# Patient Record
Sex: FEMALE | Race: WHITE | Employment: OTHER | URBAN - METROPOLITAN AREA
[De-identification: names, ages, dates, MRNs, and addresses within clinical notes are randomized per-mention and may not be internally consistent; named-entity substitution may affect disease eponyms.]

---

## 2017-02-21 ENCOUNTER — HOSPITAL ENCOUNTER (OUTPATIENT)
Dept: ULTRASOUND IMAGING | Facility: HOSPITAL | Age: 77
Discharge: HOME | End: 2017-02-21
Attending: OTOLARYNGOLOGY
Payer: MEDICARE

## 2017-02-21 DIAGNOSIS — E04.2 NONTOXIC MULTINODULAR GOITER: ICD-10-CM

## 2017-02-21 PROCEDURE — 76536 US EXAM OF HEAD AND NECK: CPT

## 2017-05-17 PROCEDURE — 87086 URINE CULTURE/COLONY COUNT: CPT | Performed by: FAMILY MEDICINE

## 2017-05-17 PROCEDURE — 87077 CULTURE AEROBIC IDENTIFY: CPT | Performed by: FAMILY MEDICINE

## 2017-05-17 PROCEDURE — 87186 SC STD MICRODIL/AGAR DIL: CPT | Performed by: FAMILY MEDICINE

## 2017-06-06 ENCOUNTER — APPOINTMENT (OUTPATIENT)
Dept: GENERAL RADIOLOGY | Age: 77
End: 2017-06-06
Attending: PHYSICIAN ASSISTANT
Payer: MEDICARE

## 2017-06-06 ENCOUNTER — HOSPITAL ENCOUNTER (OUTPATIENT)
Age: 77
Discharge: HOME | End: 2017-06-06
Payer: MEDICARE

## 2017-06-06 VITALS
OXYGEN SATURATION: 98 % | RESPIRATION RATE: 18 BRPM | SYSTOLIC BLOOD PRESSURE: 141 MMHG | WEIGHT: 145 LBS | DIASTOLIC BLOOD PRESSURE: 66 MMHG | HEART RATE: 73 BPM | BODY MASS INDEX: 25 KG/M2 | TEMPERATURE: 98 F

## 2017-06-06 DIAGNOSIS — T14.8XXA SKIN AVULSION: ICD-10-CM

## 2017-06-06 DIAGNOSIS — R03.0 ELEVATED BLOOD PRESSURE, SITUATIONAL: ICD-10-CM

## 2017-06-06 DIAGNOSIS — S50.11XA CONTUSION OF RIGHT FOREARM, INITIAL ENCOUNTER: Primary | ICD-10-CM

## 2017-06-06 PROCEDURE — 73090 X-RAY EXAM OF FOREARM: CPT | Performed by: PHYSICIAN ASSISTANT

## 2017-06-06 PROCEDURE — 99214 OFFICE O/P EST MOD 30 MIN: CPT

## 2017-06-06 PROCEDURE — 90471 IMMUNIZATION ADMIN: CPT

## 2017-06-06 PROCEDURE — 99204 OFFICE O/P NEW MOD 45 MIN: CPT

## 2017-06-07 NOTE — ED INITIAL ASSESSMENT (HPI)
Pt c/o was hit in the right FA by a foul baseball today 30 min ago. Pt presents with skin tear/superficial avulsion to right FA. Pt reports pain on opening and closing right fist. Denies other injuries or complaints.

## 2017-06-07 NOTE — ED PROVIDER NOTES
Patient Seen in: [facility] In [address]    History   Patient presents with:  Upper Extremity Injury (musculoskeletal)    Stated Complaint: right arm got hit with ball and tore skin off    HPI    70 yo female here with c/o a large skin avuls Doxazosin Mesylate 2 MG Oral Tab,  Take 1 tablet (2 mg total) by mouth daily.    Methenamine Hippurate 1 G Oral Tab,  Take one tablet with vitamin C after intercourse as needed   Multiple Vitamins-Minerals (CENTRUM SILVER) Oral Tab,  Take 1 tablet by mouth Head: Normocephalic.    Right Ear: Tympanic membrane normal.   Left Ear: Tympanic membrane normal.   Nose: Nose normal.   Mouth/Throat: Uvula is midline, oropharynx is clear and moist and mucous membranes are normal.   Eyes: Conjunctivae and EOM are normal. I discussed the plan of care with the patient, who expresses understanding. All questions and concerns are addressed to the patients satisfaction prior to discharge today.          Disposition and Plan     Clinical Impression:  Contusion of right forearm, i

## 2017-08-05 ENCOUNTER — HOSPITAL ENCOUNTER (OUTPATIENT)
Dept: MRI IMAGING | Age: 77
Discharge: HOME | End: 2017-08-05
Attending: FAMILY MEDICINE
Payer: MEDICARE

## 2017-08-05 DIAGNOSIS — M25.862 CYST OF KNEE JOINT, LEFT: ICD-10-CM

## 2017-08-05 DIAGNOSIS — M17.0 PRIMARY OSTEOARTHRITIS OF BOTH KNEES: ICD-10-CM

## 2017-08-05 PROCEDURE — 73721 MRI JNT OF LWR EXTRE W/O DYE: CPT | Performed by: FAMILY MEDICINE

## 2017-08-09 NOTE — PROGRESS NOTES
MRI shows several problems in the L knee including a meniscal tear on the medial side as well as moderate-severe arthritis throughout the knee in all 3 compartments. She also has extensive degeneration of the ACL but it is still intact.   She needs to see

## 2017-08-21 PROCEDURE — 87086 URINE CULTURE/COLONY COUNT: CPT | Performed by: PHYSICIAN ASSISTANT

## 2017-08-21 PROCEDURE — 87186 SC STD MICRODIL/AGAR DIL: CPT | Performed by: PHYSICIAN ASSISTANT

## 2017-08-21 PROCEDURE — 87077 CULTURE AEROBIC IDENTIFY: CPT | Performed by: PHYSICIAN ASSISTANT

## 2017-09-20 PROBLEM — M17.12 PRIMARY OSTEOARTHRITIS OF LEFT KNEE: Status: ACTIVE | Noted: 2017-09-20

## 2017-11-15 PROCEDURE — 87086 URINE CULTURE/COLONY COUNT: CPT | Performed by: UROLOGY

## 2017-11-15 PROCEDURE — 87186 SC STD MICRODIL/AGAR DIL: CPT | Performed by: UROLOGY

## 2018-01-26 PROCEDURE — 87086 URINE CULTURE/COLONY COUNT: CPT | Performed by: UROLOGY

## 2018-01-26 PROCEDURE — 87186 SC STD MICRODIL/AGAR DIL: CPT | Performed by: UROLOGY

## 2018-01-26 PROCEDURE — 87077 CULTURE AEROBIC IDENTIFY: CPT | Performed by: UROLOGY

## 2018-03-05 PROCEDURE — 87186 SC STD MICRODIL/AGAR DIL: CPT | Performed by: OBSTETRICS & GYNECOLOGY

## 2018-03-05 PROCEDURE — 87086 URINE CULTURE/COLONY COUNT: CPT | Performed by: OBSTETRICS & GYNECOLOGY

## 2018-03-05 PROCEDURE — 87077 CULTURE AEROBIC IDENTIFY: CPT | Performed by: OBSTETRICS & GYNECOLOGY

## 2018-03-28 PROCEDURE — 87086 URINE CULTURE/COLONY COUNT: CPT | Performed by: OBSTETRICS & GYNECOLOGY

## 2018-03-28 PROCEDURE — 87186 SC STD MICRODIL/AGAR DIL: CPT | Performed by: OBSTETRICS & GYNECOLOGY

## 2018-03-28 PROCEDURE — 87077 CULTURE AEROBIC IDENTIFY: CPT | Performed by: OBSTETRICS & GYNECOLOGY

## 2018-04-16 PROCEDURE — 87186 SC STD MICRODIL/AGAR DIL: CPT | Performed by: OBSTETRICS & GYNECOLOGY

## 2018-04-16 PROCEDURE — 87086 URINE CULTURE/COLONY COUNT: CPT | Performed by: OBSTETRICS & GYNECOLOGY

## 2018-04-16 PROCEDURE — 87077 CULTURE AEROBIC IDENTIFY: CPT | Performed by: OBSTETRICS & GYNECOLOGY

## 2018-05-04 PROCEDURE — 87086 URINE CULTURE/COLONY COUNT: CPT | Performed by: OBSTETRICS & GYNECOLOGY

## 2018-05-04 PROCEDURE — 87186 SC STD MICRODIL/AGAR DIL: CPT | Performed by: OBSTETRICS & GYNECOLOGY

## 2018-05-04 PROCEDURE — 87077 CULTURE AEROBIC IDENTIFY: CPT | Performed by: OBSTETRICS & GYNECOLOGY

## 2018-08-21 PROCEDURE — 87077 CULTURE AEROBIC IDENTIFY: CPT | Performed by: OBSTETRICS & GYNECOLOGY

## 2018-08-21 PROCEDURE — 87086 URINE CULTURE/COLONY COUNT: CPT | Performed by: OBSTETRICS & GYNECOLOGY

## 2018-08-21 PROCEDURE — 81001 URINALYSIS AUTO W/SCOPE: CPT | Performed by: OBSTETRICS & GYNECOLOGY

## 2018-08-21 PROCEDURE — 87186 SC STD MICRODIL/AGAR DIL: CPT | Performed by: OBSTETRICS & GYNECOLOGY

## 2018-09-20 PROCEDURE — 87077 CULTURE AEROBIC IDENTIFY: CPT | Performed by: OBSTETRICS & GYNECOLOGY

## 2018-09-20 PROCEDURE — 87186 SC STD MICRODIL/AGAR DIL: CPT | Performed by: OBSTETRICS & GYNECOLOGY

## 2018-09-20 PROCEDURE — 87086 URINE CULTURE/COLONY COUNT: CPT | Performed by: OBSTETRICS & GYNECOLOGY

## 2018-10-12 PROCEDURE — 87086 URINE CULTURE/COLONY COUNT: CPT | Performed by: EMERGENCY MEDICINE

## 2018-10-26 PROCEDURE — 87086 URINE CULTURE/COLONY COUNT: CPT | Performed by: OBSTETRICS & GYNECOLOGY

## 2018-10-26 PROCEDURE — 87186 SC STD MICRODIL/AGAR DIL: CPT | Performed by: OBSTETRICS & GYNECOLOGY

## 2018-10-26 PROCEDURE — 87077 CULTURE AEROBIC IDENTIFY: CPT | Performed by: OBSTETRICS & GYNECOLOGY

## 2018-11-19 PROCEDURE — 87088 URINE BACTERIA CULTURE: CPT | Performed by: OBSTETRICS & GYNECOLOGY

## 2018-11-19 PROCEDURE — 87086 URINE CULTURE/COLONY COUNT: CPT | Performed by: OBSTETRICS & GYNECOLOGY

## 2018-11-19 PROCEDURE — 87186 SC STD MICRODIL/AGAR DIL: CPT | Performed by: OBSTETRICS & GYNECOLOGY

## 2018-12-04 PROCEDURE — 87088 URINE BACTERIA CULTURE: CPT | Performed by: OBSTETRICS & GYNECOLOGY

## 2018-12-04 PROCEDURE — 87086 URINE CULTURE/COLONY COUNT: CPT | Performed by: OBSTETRICS & GYNECOLOGY

## 2018-12-04 PROCEDURE — 81001 URINALYSIS AUTO W/SCOPE: CPT | Performed by: OBSTETRICS & GYNECOLOGY

## 2018-12-04 PROCEDURE — 87186 SC STD MICRODIL/AGAR DIL: CPT | Performed by: OBSTETRICS & GYNECOLOGY

## 2019-02-21 PROBLEM — E55.9 VITAMIN D DEFICIENCY: Status: ACTIVE | Noted: 2019-02-21

## 2019-02-21 PROBLEM — R52 PAINFUL SCAR: Status: ACTIVE | Noted: 2019-02-21

## 2019-02-21 PROBLEM — L90.5 PAINFUL SCAR: Status: ACTIVE | Noted: 2019-02-21

## 2019-03-21 ENCOUNTER — HOSPITAL ENCOUNTER (OUTPATIENT)
Dept: CT IMAGING | Facility: HOSPITAL | Age: 79
Discharge: HOME | End: 2019-03-21
Attending: FAMILY MEDICINE

## 2019-03-21 DIAGNOSIS — Z13.9 ENCOUNTER FOR SCREENING: ICD-10-CM

## 2019-04-15 ENCOUNTER — TELEPHONE (OUTPATIENT)
Dept: CARDIAC REHAB | Facility: HOSPITAL | Age: 79
End: 2019-04-15

## 2019-04-15 NOTE — PROGRESS NOTES
Pt with HS scan done on 3/21/19 initial prelim score was 1.43 and her score increased to 23.37. Pt notified of score variance and change in risk for low to mild risk. Pt enc to follow up with MD for further guidance on prevention. ( previous HS in 2015 was

## 2019-05-02 NOTE — PROGRESS NOTES
Pt with a minimal amount of coronary artery plaque but at her age that is a good thing. No need for further treatment.

## 2019-05-22 PROCEDURE — 87077 CULTURE AEROBIC IDENTIFY: CPT | Performed by: OBSTETRICS & GYNECOLOGY

## 2019-05-22 PROCEDURE — 87186 SC STD MICRODIL/AGAR DIL: CPT | Performed by: OBSTETRICS & GYNECOLOGY

## 2019-05-22 PROCEDURE — 87086 URINE CULTURE/COLONY COUNT: CPT | Performed by: OBSTETRICS & GYNECOLOGY

## 2019-05-22 PROCEDURE — 81001 URINALYSIS AUTO W/SCOPE: CPT | Performed by: OBSTETRICS & GYNECOLOGY

## 2019-06-10 PROCEDURE — 87086 URINE CULTURE/COLONY COUNT: CPT | Performed by: OBSTETRICS & GYNECOLOGY

## 2019-06-10 PROCEDURE — 87186 SC STD MICRODIL/AGAR DIL: CPT | Performed by: OBSTETRICS & GYNECOLOGY

## 2019-06-10 PROCEDURE — 87077 CULTURE AEROBIC IDENTIFY: CPT | Performed by: OBSTETRICS & GYNECOLOGY

## 2019-06-27 PROCEDURE — 87086 URINE CULTURE/COLONY COUNT: CPT | Performed by: OBSTETRICS & GYNECOLOGY

## 2019-08-14 PROCEDURE — 87077 CULTURE AEROBIC IDENTIFY: CPT | Performed by: OBSTETRICS & GYNECOLOGY

## 2019-08-14 PROCEDURE — 87086 URINE CULTURE/COLONY COUNT: CPT | Performed by: OBSTETRICS & GYNECOLOGY

## 2019-08-14 PROCEDURE — 87186 SC STD MICRODIL/AGAR DIL: CPT | Performed by: OBSTETRICS & GYNECOLOGY

## 2019-10-09 PROCEDURE — 87088 URINE BACTERIA CULTURE: CPT | Performed by: UROLOGY

## 2019-10-09 PROCEDURE — 87186 SC STD MICRODIL/AGAR DIL: CPT | Performed by: UROLOGY

## 2019-10-09 PROCEDURE — 87086 URINE CULTURE/COLONY COUNT: CPT | Performed by: UROLOGY

## 2020-05-20 PROBLEM — S03.00XA DISLOCATION OF TEMPOROMANDIBULAR JOINT: Status: ACTIVE | Noted: 2020-05-20

## 2020-05-20 PROBLEM — K59.00 CONSTIPATION: Status: ACTIVE | Noted: 2020-05-20

## 2020-05-20 PROBLEM — H26.9 CATARACT: Status: ACTIVE | Noted: 2020-05-20

## 2020-06-18 ENCOUNTER — OFFICE VISIT (OUTPATIENT)
Dept: PODIATRY CLINIC | Facility: CLINIC | Age: 80
End: 2020-06-18
Payer: MEDICARE

## 2020-06-18 DIAGNOSIS — M21.70 ACQUIRED UNEQUAL LIMB LENGTH: ICD-10-CM

## 2020-06-18 DIAGNOSIS — M21.6X1 PLANTAR FLEXED METATARSAL BONE OF RIGHT FOOT: ICD-10-CM

## 2020-06-18 DIAGNOSIS — B35.1 DERMATOPHYTOSIS OF NAIL: ICD-10-CM

## 2020-06-18 DIAGNOSIS — M79.672 LEFT FOOT PAIN: Primary | ICD-10-CM

## 2020-06-18 DIAGNOSIS — M20.5X9 HALLUX LIMITUS, UNSPECIFIED LATERALITY: ICD-10-CM

## 2020-06-18 DIAGNOSIS — M79.671 RIGHT FOOT PAIN: ICD-10-CM

## 2020-06-18 PROCEDURE — 99203 OFFICE O/P NEW LOW 30 MIN: CPT | Performed by: PODIATRIST

## 2020-06-18 NOTE — PROGRESS NOTES
Miah Finn is a 79year old female. Patient presents with:  New Patient: Patient thinks that she may have warts on the bottom the right foot, toe nail fungus, and that she believes that the right leg is shorter than the left.  Patient is a Media Ama GERD (gastroesophageal reflux disease)     Dr. Immanuel Lucio EGS adn colosncopy 2010   • Migraine headache    • Osteoporosis    • Urinary retention       Past Surgical History:   Procedure Laterality Date   • HIP INJECTION (PAIN) N/A 8/22/2013    Performed by Marylene Ode normal.   3. Musculoskeletal: All muscle groups are graded 5 out of 5 in the foot and ankle.    4. Neurological: Normal sharp dull sensation; reflexes normal.  She has dorsal spurring with decreased motion through the great toe joint on both feet she has we

## 2020-07-27 ENCOUNTER — OFFICE VISIT (OUTPATIENT)
Dept: PODIATRY CLINIC | Facility: CLINIC | Age: 80
End: 2020-07-27
Payer: MEDICARE

## 2020-07-27 DIAGNOSIS — B35.1 DERMATOPHYTOSIS OF NAIL: ICD-10-CM

## 2020-07-27 DIAGNOSIS — M21.70 ACQUIRED UNEQUAL LIMB LENGTH: ICD-10-CM

## 2020-07-27 DIAGNOSIS — M20.5X9 HALLUX LIMITUS, UNSPECIFIED LATERALITY: ICD-10-CM

## 2020-07-27 DIAGNOSIS — M79.672 LEFT FOOT PAIN: Primary | ICD-10-CM

## 2020-07-27 DIAGNOSIS — M79.671 RIGHT FOOT PAIN: ICD-10-CM

## 2020-07-27 PROCEDURE — 99213 OFFICE O/P EST LOW 20 MIN: CPT | Performed by: PODIATRIST

## 2020-07-27 NOTE — PROGRESS NOTES
Cesario Rodríguez is a 79year old female. Patient presents with:  Test Results: nail fungus - heel lift wans't comfortable - joint pain. HPI:     Presents today cultures were positive for nail fungus discussed treatment with her.     Allergies: SURGERY      bilateral   Dr Beather Cooks  right 2015  left 2016    • LEFT PHACOEMULSIFICATION OF CATARACT WITH INTRAOCULAR LENS IMPLANT [id number] Left 6/24/2020    Performed by Alyson Chavis MD at [address] [place] [address]   • OTHER      R THR   • OTHER SURGICAL HIS Musculoskeletal: All muscle groups are graded 5 out of 5 in the foot and ankle.    4. Neurological: Normal sharp dull sensation; reflexes normal.  nails are thick and dystrophic we discussed briefly the topical medication versus oral she is not a fan of the

## 2020-08-17 ENCOUNTER — TELEPHONE (OUTPATIENT)
Dept: CARDIOLOGY | Age: 80
End: 2020-08-17

## 2020-08-28 ENCOUNTER — LAB ENCOUNTER (OUTPATIENT)
Dept: LAB | Age: 80
End: 2020-08-28
Attending: INTERNAL MEDICINE
Payer: MEDICARE

## 2020-08-28 DIAGNOSIS — I10 ESSENTIAL HYPERTENSION: ICD-10-CM

## 2020-08-28 PROCEDURE — 36415 COLL VENOUS BLD VENIPUNCTURE: CPT

## 2020-08-28 PROCEDURE — 83835 ASSAY OF METANEPHRINES: CPT

## 2020-08-28 PROCEDURE — 84244 ASSAY OF RENIN: CPT

## 2020-08-28 PROCEDURE — 82088 ASSAY OF ALDOSTERONE: CPT

## 2020-08-31 LAB
METANEPHRINE: 0.12 NMOL/L
NORMETANEPHRINE: 0.51 NMOL/L

## 2020-09-07 LAB
ALDOSTERONE/RENIN ACTIVITY RATIO: 13.5 RATIO
ALDOSTERONE: 5.4 NG/DL
RENIN ACTIVITY: 0.4 NG/ML/HR

## 2021-01-04 ENCOUNTER — HOSPITAL ENCOUNTER (OUTPATIENT)
Dept: ULTRASOUND IMAGING | Age: 81
Discharge: HOME | End: 2021-01-04
Attending: OTOLARYNGOLOGY
Payer: MEDICARE

## 2021-01-04 DIAGNOSIS — E04.2 NONTOXIC MULTINODULAR GOITER: ICD-10-CM

## 2021-01-04 PROCEDURE — 76536 US EXAM OF HEAD AND NECK: CPT | Performed by: OTOLARYNGOLOGY

## 2021-04-04 ENCOUNTER — LAB ENCOUNTER (OUTPATIENT)
Dept: LAB | Facility: HOSPITAL | Age: 81
End: 2021-04-04
Attending: INTERNAL MEDICINE
Payer: MEDICARE

## 2021-04-04 DIAGNOSIS — Z01.818 PRE-OP TESTING: ICD-10-CM

## 2021-04-07 ENCOUNTER — HOSPITAL ENCOUNTER (EMERGENCY)
Facility: HOSPITAL | Age: 81
Discharge: HOME | End: 2021-04-07
Attending: EMERGENCY MEDICINE
Payer: MEDICARE

## 2021-04-07 VITALS
RESPIRATION RATE: 16 BRPM | HEART RATE: 87 BPM | SYSTOLIC BLOOD PRESSURE: 164 MMHG | WEIGHT: 139 LBS | DIASTOLIC BLOOD PRESSURE: 73 MMHG | TEMPERATURE: 98 F | BODY MASS INDEX: 24.63 KG/M2 | OXYGEN SATURATION: 100 % | HEIGHT: 63 IN

## 2021-04-07 DIAGNOSIS — N39.0 URINARY TRACT INFECTION WITHOUT HEMATURIA, SITE UNSPECIFIED: Primary | ICD-10-CM

## 2021-04-07 DIAGNOSIS — R33.9 URINARY RETENTION: ICD-10-CM

## 2021-04-07 PROCEDURE — 81001 URINALYSIS AUTO W/SCOPE: CPT | Performed by: EMERGENCY MEDICINE

## 2021-04-07 PROCEDURE — 87086 URINE CULTURE/COLONY COUNT: CPT | Performed by: EMERGENCY MEDICINE

## 2021-04-07 PROCEDURE — 87186 SC STD MICRODIL/AGAR DIL: CPT | Performed by: EMERGENCY MEDICINE

## 2021-04-07 PROCEDURE — 96372 THER/PROPH/DIAG INJ SC/IM: CPT

## 2021-04-07 PROCEDURE — 87077 CULTURE AEROBIC IDENTIFY: CPT | Performed by: EMERGENCY MEDICINE

## 2021-04-07 PROCEDURE — 99284 EMERGENCY DEPT VISIT MOD MDM: CPT

## 2021-04-07 PROCEDURE — 51702 INSERT TEMP BLADDER CATH: CPT

## 2021-04-07 PROCEDURE — 99283 EMERGENCY DEPT VISIT LOW MDM: CPT

## 2021-04-07 RX ORDER — LEVOFLOXACIN 250 MG/1
250 TABLET ORAL DAILY
Qty: 10 TABLET | Refills: 0 | Status: SHIPPED | OUTPATIENT
Start: 2021-04-07 | End: 2021-04-17

## 2021-04-07 NOTE — ED PROVIDER NOTES
Patient Seen in: [facility] Emergency Department      History   Patient presents with:  Urinary Symptoms    Stated Complaint: patient unable to self cath at home d/t swelling to vaginal area as prepping fo*    HPI/Subjective:   HPI    [de-identified]year old fe Laterality Date   • HIP INJECTION (PAIN) N/A 8/22/2013    Performed by Aravind Mayberry MD at [address]      bilateral   Dr Jamin Gong  right 2015  left 2016    • LEFT PHACOEMULSIFICATION OF CATARACT WITH INTRAOCU well-developed. HENT:      Head: Normocephalic and atraumatic. Cardiovascular:      Rate and Rhythm: Normal rate and regular rhythm. Heart sounds: Normal heart sounds.    Pulmonary:      Effort: Pulmonary effort is normal.      Breath sounds: Ayesha Antis been related to a urinary tract infection rather than the medication as this is an unusual side effect. Regardless we did monitor after receiving the ceftriaxone and she tolerated it well. She will be placed on a small dose of Levaquin.   I did contact ur

## 2021-04-07 NOTE — ED QUICK NOTES
Pt rpts she has cathed herself today, but doesn't feel as though the amount is \"enough. \" Pt here bc she has chronic UTIs, and her daughter is worried she may \"go septic. \" MD notified.

## 2021-04-07 NOTE — ED INITIAL ASSESSMENT (HPI)
PT straight caths for 7 years since hip surgery, is now a chronic UTI patient. PT has ongoing vaginal swelling as well. PT has last successful straight cath last night. Denies fevers.  Report having a \"full stomach\"

## 2021-04-08 ENCOUNTER — HOSPITAL ENCOUNTER (OUTPATIENT)
Facility: HOSPITAL | Age: 81
Setting detail: HOSPITAL OUTPATIENT SURGERY
Discharge: HOME | End: 2021-04-08
Attending: INTERNAL MEDICINE | Admitting: INTERNAL MEDICINE
Payer: MEDICARE

## 2021-04-08 ENCOUNTER — ANESTHESIA EVENT (OUTPATIENT)
Dept: ENDOSCOPY | Facility: HOSPITAL | Age: 81
End: 2021-04-08
Payer: MEDICARE

## 2021-04-08 ENCOUNTER — ANESTHESIA (OUTPATIENT)
Dept: ENDOSCOPY | Facility: HOSPITAL | Age: 81
End: 2021-04-08
Payer: MEDICARE

## 2021-04-08 VITALS
BODY MASS INDEX: 25.66 KG/M2 | WEIGHT: 143 LBS | RESPIRATION RATE: 12 BRPM | DIASTOLIC BLOOD PRESSURE: 66 MMHG | HEIGHT: 62.5 IN | TEMPERATURE: 99 F | OXYGEN SATURATION: 100 % | SYSTOLIC BLOOD PRESSURE: 138 MMHG | HEART RATE: 69 BPM

## 2021-04-08 DIAGNOSIS — R10.13 EPIGASTRIC PAIN: ICD-10-CM

## 2021-04-08 DIAGNOSIS — Z86.010 HISTORY OF ADENOMATOUS POLYP OF COLON: ICD-10-CM

## 2021-04-08 DIAGNOSIS — R14.0 BLOATING: ICD-10-CM

## 2021-04-08 PROCEDURE — 0DB98ZX EXCISION OF DUODENUM, VIA NATURAL OR ARTIFICIAL OPENING ENDOSCOPIC, DIAGNOSTIC: ICD-10-PCS | Performed by: INTERNAL MEDICINE

## 2021-04-08 PROCEDURE — 0DBK8ZX EXCISION OF ASCENDING COLON, VIA NATURAL OR ARTIFICIAL OPENING ENDOSCOPIC, DIAGNOSTIC: ICD-10-PCS | Performed by: INTERNAL MEDICINE

## 2021-04-08 PROCEDURE — 0DB78ZX EXCISION OF STOMACH, PYLORUS, VIA NATURAL OR ARTIFICIAL OPENING ENDOSCOPIC, DIAGNOSTIC: ICD-10-PCS | Performed by: INTERNAL MEDICINE

## 2021-04-08 PROCEDURE — 0DB58ZX EXCISION OF ESOPHAGUS, VIA NATURAL OR ARTIFICIAL OPENING ENDOSCOPIC, DIAGNOSTIC: ICD-10-PCS | Performed by: INTERNAL MEDICINE

## 2021-04-08 PROCEDURE — 0DBN8ZX EXCISION OF SIGMOID COLON, VIA NATURAL OR ARTIFICIAL OPENING ENDOSCOPIC, DIAGNOSTIC: ICD-10-PCS | Performed by: INTERNAL MEDICINE

## 2021-04-08 PROCEDURE — 88305 TISSUE EXAM BY PATHOLOGIST: CPT | Performed by: INTERNAL MEDICINE

## 2021-04-08 RX ORDER — HYDROCODONE BITARTRATE AND ACETAMINOPHEN 10; 325 MG/1; MG/1
2 TABLET ORAL AS NEEDED
Status: DISCONTINUED | OUTPATIENT
Start: 2021-04-08 | End: 2021-04-08

## 2021-04-08 RX ORDER — HYDROCODONE BITARTRATE AND ACETAMINOPHEN 10; 325 MG/1; MG/1
1 TABLET ORAL AS NEEDED
Status: DISCONTINUED | OUTPATIENT
Start: 2021-04-08 | End: 2021-04-08

## 2021-04-08 RX ORDER — NALOXONE HYDROCHLORIDE 0.4 MG/ML
80 INJECTION, SOLUTION INTRAMUSCULAR; INTRAVENOUS; SUBCUTANEOUS AS NEEDED
Status: DISCONTINUED | OUTPATIENT
Start: 2021-04-08 | End: 2021-04-08

## 2021-04-08 RX ORDER — SODIUM CHLORIDE, SODIUM LACTATE, POTASSIUM CHLORIDE, CALCIUM CHLORIDE 600; 310; 30; 20 MG/100ML; MG/100ML; MG/100ML; MG/100ML
INJECTION, SOLUTION INTRAVENOUS CONTINUOUS
Status: DISCONTINUED | OUTPATIENT
Start: 2021-04-08 | End: 2021-04-08

## 2021-04-08 RX ORDER — ONDANSETRON 2 MG/ML
4 INJECTION INTRAMUSCULAR; INTRAVENOUS AS NEEDED
Status: DISCONTINUED | OUTPATIENT
Start: 2021-04-08 | End: 2021-04-08

## 2021-04-08 RX ORDER — HYDROMORPHONE HYDROCHLORIDE 1 MG/ML
0.4 INJECTION, SOLUTION INTRAMUSCULAR; INTRAVENOUS; SUBCUTANEOUS EVERY 5 MIN PRN
Status: DISCONTINUED | OUTPATIENT
Start: 2021-04-08 | End: 2021-04-08

## 2021-04-08 RX ORDER — METOCLOPRAMIDE HYDROCHLORIDE 5 MG/ML
10 INJECTION INTRAMUSCULAR; INTRAVENOUS AS NEEDED
Status: DISCONTINUED | OUTPATIENT
Start: 2021-04-08 | End: 2021-04-08

## 2021-04-08 RX ORDER — LIDOCAINE HYDROCHLORIDE 10 MG/ML
INJECTION, SOLUTION EPIDURAL; INFILTRATION; INTRACAUDAL; PERINEURAL AS NEEDED
Status: DISCONTINUED | OUTPATIENT
Start: 2021-04-08 | End: 2021-04-08 | Stop reason: SURG

## 2021-04-08 RX ADMIN — SODIUM CHLORIDE, SODIUM LACTATE, POTASSIUM CHLORIDE, CALCIUM CHLORIDE: 600; 310; 30; 20 INJECTION, SOLUTION INTRAVENOUS at 12:27:00

## 2021-04-08 RX ADMIN — SODIUM CHLORIDE, SODIUM LACTATE, POTASSIUM CHLORIDE, CALCIUM CHLORIDE: 600; 310; 30; 20 INJECTION, SOLUTION INTRAVENOUS at 11:58:00

## 2021-04-08 RX ADMIN — LIDOCAINE HYDROCHLORIDE 25 MG: 10 INJECTION, SOLUTION EPIDURAL; INFILTRATION; INTRACAUDAL; PERINEURAL at 11:58:00

## 2021-04-08 NOTE — H&P
[id number] [facility] Patient Status:  Hospital Outpatient Surgery    1940 MRN [id number]   Location [id number] [place] [address] Attending Maximo Burgos MD   Date 2021 PCP Ricky Abreu MD biopsy 1980   • OTHER SURGICAL HISTORY  04/25/2014    flow us- Dr. Nehemiah Choi   • OTHER SURGICAL HISTORY  07/01/2019    cysto dr Thi Garcia   • Bryan Foster [id number] Right 7/22/2020    Performed by Justin Wan Dislocation of temporomandibular joint      Dyspepsia, GERD, gas bloat, early satiety, h/o colon polyps    Plan;  EGD and colonoscopy    Charly Delatorre  4/8/2021  11:53 AM

## 2021-04-08 NOTE — ANESTHESIA POSTPROCEDURE EVALUATION
[id number] [facility] Patient Status:  Hospital Outpatient Surgery   Age/Gender [de-identified]year old female MRN [id number]   Location [address] [address] Attending Dinora Pennington MD   Hosp Day # 0 PCP Aayush Rose MD

## 2021-04-08 NOTE — OPERATIVE REPORT
Laura Stewartolz Patient Status:  Hospital Outpatient Surgery    1940 MRN [id number]   Location [id number] [place] [address] Attending Lawrence Steinberg MD   Date 2021 PCP Regine Estrella MD     PREOPERATIVE DIAGNOSIS/INDICATI

## 2021-04-08 NOTE — OPERATIVE REPORT
Selmaolivier Fabian Patient Status:  Hospital Outpatient Surgery    1940 MRN [id number]   Location [address] [place] [address] Attending Nitesh Morgan MD   Date 2021 PCP Dariusz Read MD     PREOPERATIVE DIAGNOSIS/INDICATI precautions, watch for bleeding, infection, perforation, adverse drug reactions   - Follow biopsies.     Ann Raya  4/8/2021  12:29 PM

## 2021-04-08 NOTE — ANESTHESIA PREPROCEDURE EVALUATION
PRE-OP EVALUATION    Patient Name: Compa Gutierres    Admit Diagnosis: History of adenomatous polyp of colon [Z86.010]  Bloating [R14.0]  Epigastric pain [R10.13]    Pre-op Diagnosis: History of adenomatous polyp of colon [Z86.010]  Bloating [R14.0] Complications           GI/Hepatic/Renal      (+) GERD                           Cardiovascular                                                       Endo/Other                                  Pulmonary                           Neuro/Psych Cardiovascular    Cardiovascular exam normal.  Rhythm: regular  Rate: normal     Dental             Pulmonary    Pulmonary exam normal.                 Other findings            ASA: 2   Plan: MAC  NPO status verified and patient meets guidelines.

## 2021-06-06 ENCOUNTER — APPOINTMENT (OUTPATIENT)
Dept: CT IMAGING | Facility: HOSPITAL | Age: 81
End: 2021-06-06
Attending: EMERGENCY MEDICINE
Payer: MEDICARE

## 2021-06-06 ENCOUNTER — HOSPITAL ENCOUNTER (EMERGENCY)
Facility: HOSPITAL | Age: 81
Discharge: HOME | End: 2021-06-06
Attending: EMERGENCY MEDICINE
Payer: MEDICARE

## 2021-06-06 ENCOUNTER — APPOINTMENT (OUTPATIENT)
Dept: MRI IMAGING | Facility: HOSPITAL | Age: 81
End: 2021-06-06
Attending: EMERGENCY MEDICINE
Payer: MEDICARE

## 2021-06-06 VITALS
HEIGHT: 62 IN | OXYGEN SATURATION: 98 % | TEMPERATURE: 97 F | SYSTOLIC BLOOD PRESSURE: 142 MMHG | HEART RATE: 70 BPM | BODY MASS INDEX: 26.31 KG/M2 | WEIGHT: 143 LBS | RESPIRATION RATE: 16 BRPM | DIASTOLIC BLOOD PRESSURE: 73 MMHG

## 2021-06-06 DIAGNOSIS — R51.9 NONINTRACTABLE HEADACHE, UNSPECIFIED CHRONICITY PATTERN, UNSPECIFIED HEADACHE TYPE: Primary | ICD-10-CM

## 2021-06-06 PROCEDURE — 80053 COMPREHEN METABOLIC PANEL: CPT | Performed by: EMERGENCY MEDICINE

## 2021-06-06 PROCEDURE — 70553 MRI BRAIN STEM W/O & W/DYE: CPT | Performed by: EMERGENCY MEDICINE

## 2021-06-06 PROCEDURE — 70450 CT HEAD/BRAIN W/O DYE: CPT | Performed by: EMERGENCY MEDICINE

## 2021-06-06 PROCEDURE — A9575 INJ GADOTERATE MEGLUMI 0.1ML: HCPCS | Performed by: EMERGENCY MEDICINE

## 2021-06-06 PROCEDURE — 99284 EMERGENCY DEPT VISIT MOD MDM: CPT

## 2021-06-06 PROCEDURE — 85652 RBC SED RATE AUTOMATED: CPT | Performed by: EMERGENCY MEDICINE

## 2021-06-06 PROCEDURE — 99285 EMERGENCY DEPT VISIT HI MDM: CPT

## 2021-06-06 PROCEDURE — 85025 COMPLETE CBC W/AUTO DIFF WBC: CPT | Performed by: EMERGENCY MEDICINE

## 2021-06-06 RX ORDER — TETRACAINE HYDROCHLORIDE 5 MG/ML
1 SOLUTION OPHTHALMIC ONCE
Status: COMPLETED | OUTPATIENT
Start: 2021-06-06 | End: 2021-06-06

## 2021-06-06 RX ORDER — TETRACAINE HYDROCHLORIDE 5 MG/ML
SOLUTION OPHTHALMIC
Status: COMPLETED
Start: 2021-06-06 | End: 2021-06-06

## 2021-06-06 NOTE — ED INITIAL ASSESSMENT (HPI)
PT TO ED FROM HOME WITH C/O RIGHT SIDED HEADACHE THAT STARTED LAST NIGHT.  DENIES N/V, DENIES ANY VISUAL DISTURBANCES, RIGHT SIDED PAIN THAT RADIATES DOWN RIGHT SIDE OF FACE AND INTO NECK, \"FEELS LIKE PINS AND NEEDLES\"

## 2021-06-06 NOTE — ED PROVIDER NOTES
Patient Seen in: [facility] Emergency Department      History   Patient presents with:  Headache    Stated Complaint: Head is tender to the touch, travelling down her neck    HPI/Subjective:   HPI    Patient is a 20-year-old woman presents with a he Procedure: HIP INJECTION (PAIN); Surgeon: Marguerite Hazel MD;  Location: [facility]   • FLUOROSCOPIC GUIDANCE NEEDLE PLACEMENT  8/22/2013    Procedure: HIP INJECTION (PAIN);   Surgeon: Marguerite Hazel MD;  Location: [address] [place] [address] Dr NEWSOME Constitutional:       General: She is not in acute distress. Appearance: She is well-developed. She is not toxic-appearing. HENT:      Head: Normocephalic and atraumatic. Eyes:      General: No scleral icterus.      Conjunctiva/sclera: Conjunctiva CONTRAST, 5/01/2007, 10:38 AM.         INDICATIONS:  Head is tender to the touch, travelling down her neck         TECHNIQUE:  Noncontrast CT scanning is performed through the brain. Dose     reduction techniques were used.  Dose information is transmitted headache, unspecified chronicity pattern, unspecified headache type  (primary encounter diagnosis)     Disposition:  There is no disposition on file for this visit. There is no disposition time on file for this visit.     Follow-up:  Flavia Monge MD

## 2021-06-09 PROBLEM — K92.89 GAS BLOAT SYNDROME: Status: ACTIVE | Noted: 2021-06-09

## 2021-10-19 ENCOUNTER — HOSPITAL ENCOUNTER (OUTPATIENT)
Dept: ULTRASOUND IMAGING | Age: 81
Discharge: HOME | End: 2021-10-19
Attending: OTOLARYNGOLOGY
Payer: MEDICARE

## 2021-10-19 DIAGNOSIS — E04.2 MULTIPLE THYROID NODULES: ICD-10-CM

## 2021-10-19 PROCEDURE — 76536 US EXAM OF HEAD AND NECK: CPT | Performed by: OTOLARYNGOLOGY

## 2021-11-12 ENCOUNTER — HOSPITAL ENCOUNTER (OUTPATIENT)
Age: 81
Discharge: HOME | End: 2021-11-12
Payer: MEDICARE

## 2021-11-12 VITALS
BODY MASS INDEX: 26.31 KG/M2 | OXYGEN SATURATION: 100 % | HEART RATE: 111 BPM | SYSTOLIC BLOOD PRESSURE: 190 MMHG | RESPIRATION RATE: 20 BRPM | TEMPERATURE: 99 F | DIASTOLIC BLOOD PRESSURE: 83 MMHG | HEIGHT: 62 IN | WEIGHT: 143 LBS

## 2021-11-12 DIAGNOSIS — Z20.822 LAB TEST NEGATIVE FOR COVID-19 VIRUS: Primary | ICD-10-CM

## 2021-11-12 DIAGNOSIS — L24.89 IRRITANT CONTACT DERMATITIS DUE TO OTHER AGENTS: ICD-10-CM

## 2021-11-12 PROCEDURE — 99212 OFFICE O/P EST SF 10 MIN: CPT

## 2021-11-12 RX ORDER — DIAPER,BRIEF,INFANT-TODD,DISP
1 EACH MISCELLANEOUS 2 TIMES DAILY
Qty: 1 EACH | Refills: 0 | Status: SHIPPED | OUTPATIENT
Start: 2021-11-12 | End: 2021-11-19

## 2021-11-13 NOTE — ED INITIAL ASSESSMENT (HPI)
Pt aox4. Pt unvaccinated to [facility]. Pt to [facility] for Covid test. Pt denies complaints. Pts daughter is having a party tomorrow and patient needs to be tested for COVID. Pt c/o rash around mouth. Pt states using new masks.

## 2021-11-13 NOTE — ED PROVIDER NOTES
Patient Seen in: [facility] [place] [facility]      History   Patient presents with:  Covid-19 Test  Rash Skin Problem    Stated Complaint: COVID TEST    Subjective:   HPI  28-year-old female presents to the immediate care for Covid test requested by her da PAIN MANAGEMENT   • HIP REPLACEMENT SURGERY      bilateral   Dr Adriane Cervantes  right 2015  left 2016    • OTHER      R THR   • OTHER SURGICAL HISTORY      hernia 1968   • OTHER SURGICAL HISTORY      breast biopsy 1980   • OTHER SURGICAL HISTORY  04/25/2014    flow sounds: Normal breath sounds. Skin:     General: Skin is warm and dry. Findings: Rash (Papular erythematous rash to mask line and minimally on the forehead. No petechiae or purpura.) present.    Neurological:      Mental Status: She is alert and noemi

## 2022-07-28 ENCOUNTER — APPOINTMENT (OUTPATIENT)
Dept: GENERAL RADIOLOGY | Age: 82
End: 2022-07-28
Attending: EMERGENCY MEDICINE
Payer: MEDICARE

## 2022-07-28 ENCOUNTER — HOSPITAL ENCOUNTER (OUTPATIENT)
Age: 82
Discharge: HOME | End: 2022-07-28
Attending: EMERGENCY MEDICINE
Payer: MEDICARE

## 2022-07-28 ENCOUNTER — APPOINTMENT (OUTPATIENT)
Dept: CT IMAGING | Age: 82
End: 2022-07-28
Attending: EMERGENCY MEDICINE
Payer: MEDICARE

## 2022-07-28 VITALS
TEMPERATURE: 100 F | HEART RATE: 65 BPM | OXYGEN SATURATION: 96 % | SYSTOLIC BLOOD PRESSURE: 198 MMHG | HEIGHT: 62 IN | BODY MASS INDEX: 29.44 KG/M2 | WEIGHT: 160 LBS | RESPIRATION RATE: 16 BRPM | DIASTOLIC BLOOD PRESSURE: 70 MMHG

## 2022-07-28 DIAGNOSIS — N39.0 URINARY TRACT INFECTION WITHOUT HEMATURIA, SITE UNSPECIFIED: ICD-10-CM

## 2022-07-28 DIAGNOSIS — U07.1 COVID: ICD-10-CM

## 2022-07-28 DIAGNOSIS — I15.9 SECONDARY HYPERTENSION: Primary | ICD-10-CM

## 2022-07-28 LAB
#MXD IC: 0.7 X10ˆ3/UL (ref 0.1–1)
BUN BLD-MCNC: 11 MG/DL (ref 7–18)
CHLORIDE BLD-SCNC: 99 MMOL/L (ref 98–112)
CO2 BLD-SCNC: 26 MMOL/L (ref 21–32)
CREAT BLD-MCNC: 0.6 MG/DL
GLUCOSE BLD-MCNC: 93 MG/DL (ref 70–99)
HCT VFR BLD AUTO: 42.2 %
HCT VFR BLD CALC: 43 %
HGB BLD-MCNC: 14 G/DL
ISTAT IONIZED CALCIUM FOR CHEM 8: 1.15 MMOL/L (ref 1.12–1.32)
LYMPHOCYTES # BLD AUTO: 0.6 X10ˆ3/UL (ref 1–4)
LYMPHOCYTES NFR BLD AUTO: 16 %
MCH RBC QN AUTO: 33.4 PG (ref 26–34)
MCHC RBC AUTO-ENTMCNC: 33.2 G/DL (ref 31–37)
MCV RBC AUTO: 100.7 FL (ref 80–100)
MIXED CELL %: 18.4 %
NEUTROPHILS # BLD AUTO: 2.6 X10ˆ3/UL (ref 1.5–7.7)
NEUTROPHILS NFR BLD AUTO: 65.6 %
PLATELET # BLD AUTO: 162 X10ˆ3/UL (ref 150–450)
POCT BILIRUBIN URINE: NEGATIVE
POCT GLUCOSE URINE: NEGATIVE MG/DL
POCT NITRITE URINE: NEGATIVE
POCT PH URINE: 7 (ref 5–8)
POCT PROTEIN URINE: NEGATIVE MG/DL
POCT SPECIFIC GRAVITY URINE: 1.01
POCT URINE CLARITY: CLEAR
POCT URINE COLOR: YELLOW
POCT UROBILINOGEN URINE: 0.2 MG/DL
POTASSIUM BLD-SCNC: 4.1 MMOL/L (ref 3.6–5.1)
RBC # BLD AUTO: 4.19 X10ˆ6/UL
SARS-COV-2 RNA RESP QL NAA+PROBE: DETECTED
SODIUM BLD-SCNC: 136 MMOL/L (ref 136–145)
TROPONIN I BLD-MCNC: 0.02 NG/ML
WBC # BLD AUTO: 3.9 X10ˆ3/UL (ref 4–11)

## 2022-07-28 PROCEDURE — 93010 ELECTROCARDIOGRAM REPORT: CPT

## 2022-07-28 PROCEDURE — 87186 SC STD MICRODIL/AGAR DIL: CPT | Performed by: EMERGENCY MEDICINE

## 2022-07-28 PROCEDURE — 87086 URINE CULTURE/COLONY COUNT: CPT | Performed by: EMERGENCY MEDICINE

## 2022-07-28 PROCEDURE — 85025 COMPLETE CBC W/AUTO DIFF WBC: CPT | Performed by: EMERGENCY MEDICINE

## 2022-07-28 PROCEDURE — 99214 OFFICE O/P EST MOD 30 MIN: CPT

## 2022-07-28 PROCEDURE — 87077 CULTURE AEROBIC IDENTIFY: CPT | Performed by: EMERGENCY MEDICINE

## 2022-07-28 PROCEDURE — 81002 URINALYSIS NONAUTO W/O SCOPE: CPT | Performed by: EMERGENCY MEDICINE

## 2022-07-28 PROCEDURE — 36415 COLL VENOUS BLD VENIPUNCTURE: CPT

## 2022-07-28 PROCEDURE — 71046 X-RAY EXAM CHEST 2 VIEWS: CPT | Performed by: EMERGENCY MEDICINE

## 2022-07-28 PROCEDURE — 84484 ASSAY OF TROPONIN QUANT: CPT

## 2022-07-28 PROCEDURE — 74177 CT ABD & PELVIS W/CONTRAST: CPT | Performed by: EMERGENCY MEDICINE

## 2022-07-28 PROCEDURE — 93005 ELECTROCARDIOGRAM TRACING: CPT

## 2022-07-28 PROCEDURE — 80047 BASIC METABLC PNL IONIZED CA: CPT

## 2022-07-28 PROCEDURE — 99215 OFFICE O/P EST HI 40 MIN: CPT

## 2022-07-28 RX ORDER — LISINOPRIL 10 MG/1
10 TABLET ORAL DAILY
Qty: 30 TABLET | Refills: 0 | Status: SHIPPED | OUTPATIENT
Start: 2022-07-28 | End: 2022-08-27

## 2022-07-28 RX ORDER — SODIUM CHLORIDE 9 MG/ML
INJECTION, SOLUTION INTRAVENOUS ONCE
Status: COMPLETED | OUTPATIENT
Start: 2022-07-28 | End: 2022-07-28

## 2022-07-28 RX ORDER — NITROFURANTOIN 25; 75 MG/1; MG/1
100 CAPSULE ORAL 2 TIMES DAILY
Qty: 20 CAPSULE | Refills: 0 | Status: SHIPPED | OUTPATIENT
Start: 2022-07-28 | End: 2022-08-02

## 2022-07-28 RX ORDER — CLONIDINE HYDROCHLORIDE 0.1 MG/1
0.1 TABLET ORAL ONCE
Status: DISCONTINUED | OUTPATIENT
Start: 2022-07-28 | End: 2022-07-28

## 2022-07-28 RX ORDER — NIRMATRELVIR AND RITONAVIR 300-100 MG
KIT ORAL
Qty: 30 TABLET | Refills: 0 | Status: SHIPPED | OUTPATIENT
Start: 2022-07-28 | End: 2022-08-02

## 2022-07-28 NOTE — ED QUICK NOTES
Dr. Infante Och aware that patient states that she just self cath'd prior to arrival and does not feel that she could straight cath herself again for awhile.

## 2022-07-28 NOTE — ED INITIAL ASSESSMENT (HPI)
Increased size of bowel movements, back pain x2-3 weeks. C/o bilateral ear pain, nausea, fatigue, cough starting 1200.

## 2022-07-29 LAB
ATRIAL RATE: 86 BPM
P AXIS: 71 DEGREES
P-R INTERVAL: 216 MS
Q-T INTERVAL: 402 MS
QRS DURATION: 132 MS
QTC CALCULATION (BEZET): 481 MS
R AXIS: 106 DEGREES
T AXIS: 39 DEGREES
VENTRICULAR RATE: 86 BPM

## 2022-08-10 ENCOUNTER — HOSPITAL ENCOUNTER (OUTPATIENT)
Facility: HOSPITAL | Age: 82
Setting detail: OBSERVATION
Discharge: NURSING, REHAB OR LONG-TERM CARE | End: 2022-08-12
Attending: EMERGENCY MEDICINE | Admitting: HOSPITALIST
Payer: MEDICARE

## 2022-08-10 ENCOUNTER — APPOINTMENT (OUTPATIENT)
Dept: GENERAL RADIOLOGY | Facility: HOSPITAL | Age: 82
End: 2022-08-10
Attending: EMERGENCY MEDICINE
Payer: MEDICARE

## 2022-08-10 DIAGNOSIS — R33.9 URINARY RETENTION: ICD-10-CM

## 2022-08-10 DIAGNOSIS — N30.00 ACUTE CYSTITIS WITHOUT HEMATURIA: Primary | ICD-10-CM

## 2022-08-10 DIAGNOSIS — F03.90 DEMENTIA WITHOUT BEHAVIORAL DISTURBANCE, UNSPECIFIED DEMENTIA TYPE (HCC): ICD-10-CM

## 2022-08-10 DIAGNOSIS — N17.9 ACUTE RENAL FAILURE, UNSPECIFIED ACUTE RENAL FAILURE TYPE (HCC): ICD-10-CM

## 2022-08-10 LAB
ALBUMIN SERPL-MCNC: 4.1 G/DL (ref 3.4–5)
ALBUMIN/GLOB SERPL: 1.4 {RATIO} (ref 1–2)
ALP LIVER SERPL-CCNC: 73 U/L
ALT SERPL-CCNC: 31 U/L
ANION GAP SERPL CALC-SCNC: 5 MMOL/L (ref 0–18)
AST SERPL-CCNC: 16 U/L (ref 15–37)
ATRIAL RATE: 84 BPM
BASOPHILS # BLD AUTO: 0.04 X10(3) UL (ref 0–0.2)
BASOPHILS NFR BLD AUTO: 0.4 %
BILIRUB SERPL-MCNC: 0.8 MG/DL (ref 0.1–2)
BILIRUB UR QL STRIP.AUTO: NEGATIVE
BUN BLD-MCNC: 75 MG/DL (ref 7–18)
CALCIUM BLD-MCNC: 9.6 MG/DL (ref 8.5–10.1)
CHLORIDE SERPL-SCNC: 106 MMOL/L (ref 98–112)
CO2 SERPL-SCNC: 24 MMOL/L (ref 21–32)
COLOR UR AUTO: YELLOW
CREAT BLD-MCNC: 1.93 MG/DL
EOSINOPHIL # BLD AUTO: 0.01 X10(3) UL (ref 0–0.7)
EOSINOPHIL NFR BLD AUTO: 0.1 %
ERYTHROCYTE [DISTWIDTH] IN BLOOD BY AUTOMATED COUNT: 11.8 %
GFR SERPLBLD BASED ON 1.73 SQ M-ARVRAT: 26 ML/MIN/1.73M2 (ref 60–?)
GLOBULIN PLAS-MCNC: 2.9 G/DL (ref 2.8–4.4)
GLUCOSE BLD-MCNC: 101 MG/DL (ref 70–99)
GLUCOSE UR STRIP.AUTO-MCNC: NEGATIVE MG/DL
HCT VFR BLD AUTO: 41.6 %
HGB BLD-MCNC: 14.2 G/DL
IMM GRANULOCYTES # BLD AUTO: 0.07 X10(3) UL (ref 0–1)
IMM GRANULOCYTES NFR BLD: 0.7 %
KETONES UR STRIP.AUTO-MCNC: NEGATIVE MG/DL
LYMPHOCYTES # BLD AUTO: 0.78 X10(3) UL (ref 1–4)
LYMPHOCYTES NFR BLD AUTO: 7.6 %
MCH RBC QN AUTO: 33.3 PG (ref 26–34)
MCHC RBC AUTO-ENTMCNC: 34.1 G/DL (ref 31–37)
MCV RBC AUTO: 97.7 FL
MONOCYTES # BLD AUTO: 1.17 X10(3) UL (ref 0.1–1)
MONOCYTES NFR BLD AUTO: 11.3 %
NEUTROPHILS # BLD AUTO: 8.26 X10 (3) UL (ref 1.5–7.7)
NEUTROPHILS # BLD AUTO: 8.26 X10(3) UL (ref 1.5–7.7)
NEUTROPHILS NFR BLD AUTO: 79.9 %
NITRITE UR QL STRIP.AUTO: NEGATIVE
OSMOLALITY SERPL CALC.SUM OF ELEC: 302 MOSM/KG (ref 275–295)
P AXIS: 77 DEGREES
P-R INTERVAL: 198 MS
PH UR STRIP.AUTO: 6 [PH] (ref 5–8)
PLATELET # BLD AUTO: 265 10(3)UL (ref 150–450)
POTASSIUM SERPL-SCNC: 4.6 MMOL/L (ref 3.5–5.1)
PROT SERPL-MCNC: 7 G/DL (ref 6.4–8.2)
Q-T INTERVAL: 388 MS
QRS DURATION: 130 MS
QTC CALCULATION (BEZET): 458 MS
R AXIS: 98 DEGREES
RBC # BLD AUTO: 4.26 X10(6)UL
RBC UR QL AUTO: NEGATIVE
SARS-COV-2 RNA RESP QL NAA+PROBE: NOT DETECTED
SODIUM SERPL-SCNC: 135 MMOL/L (ref 136–145)
SP GR UR STRIP.AUTO: 1.02 (ref 1–1.03)
T AXIS: 37 DEGREES
TSI SER-ACNC: 0.82 MIU/ML (ref 0.36–3.74)
UROBILINOGEN UR STRIP.AUTO-MCNC: 0.2 MG/DL
VENTRICULAR RATE: 84 BPM
WBC # BLD AUTO: 10.3 X10(3) UL (ref 4–11)
WBC #/AREA URNS AUTO: >50 /HPF

## 2022-08-10 PROCEDURE — 0T9B7ZZ DRAINAGE OF BLADDER, VIA NATURAL OR ARTIFICIAL OPENING: ICD-10-PCS | Performed by: EMERGENCY MEDICINE

## 2022-08-10 PROCEDURE — 71045 X-RAY EXAM CHEST 1 VIEW: CPT | Performed by: EMERGENCY MEDICINE

## 2022-08-10 RX ORDER — POLYETHYLENE GLYCOL 3350 17 G/17G
17 POWDER, FOR SOLUTION ORAL DAILY PRN
Status: DISCONTINUED | OUTPATIENT
Start: 2022-08-10 | End: 2022-08-12

## 2022-08-10 RX ORDER — HEPARIN SODIUM 5000 [USP'U]/ML
5000 INJECTION, SOLUTION INTRAVENOUS; SUBCUTANEOUS EVERY 8 HOURS SCHEDULED
Status: DISCONTINUED | OUTPATIENT
Start: 2022-08-10 | End: 2022-08-12

## 2022-08-10 RX ORDER — ONDANSETRON 2 MG/ML
4 INJECTION INTRAMUSCULAR; INTRAVENOUS EVERY 6 HOURS PRN
Status: DISCONTINUED | OUTPATIENT
Start: 2022-08-10 | End: 2022-08-12

## 2022-08-10 RX ORDER — SODIUM CHLORIDE 9 MG/ML
INJECTION, SOLUTION INTRAVENOUS CONTINUOUS
Status: DISCONTINUED | OUTPATIENT
Start: 2022-08-10 | End: 2022-08-12

## 2022-08-10 RX ORDER — SODIUM CHLORIDE 9 MG/ML
125 INJECTION, SOLUTION INTRAVENOUS CONTINUOUS
Status: DISCONTINUED | OUTPATIENT
Start: 2022-08-10 | End: 2022-08-10

## 2022-08-10 RX ORDER — DOCUSATE SODIUM 100 MG/1
100 CAPSULE, LIQUID FILLED ORAL 2 TIMES DAILY
Status: DISCONTINUED | OUTPATIENT
Start: 2022-08-10 | End: 2022-08-12

## 2022-08-10 RX ORDER — ACETAMINOPHEN 325 MG/1
650 TABLET ORAL EVERY 6 HOURS PRN
Status: DISCONTINUED | OUTPATIENT
Start: 2022-08-10 | End: 2022-08-12

## 2022-08-10 NOTE — ED INITIAL ASSESSMENT (HPI)
Pt was dx with covid x 2 weeks ago. Family states family found her today and pt was disorientated today.

## 2022-08-10 NOTE — ED QUICK NOTES
Per daughter seen in urgent care on 7/28 and diagnosed with UTI, per daughter was placed on antibiotics, but d/t confusion did not complete medication. Per daughter, patient self caths.

## 2022-08-11 LAB
ANION GAP SERPL CALC-SCNC: 6 MMOL/L (ref 0–18)
BASOPHILS # BLD AUTO: 0.02 X10(3) UL (ref 0–0.2)
BASOPHILS NFR BLD AUTO: 0.3 %
BUN BLD-MCNC: 41 MG/DL (ref 7–18)
CALCIUM BLD-MCNC: 8.4 MG/DL (ref 8.5–10.1)
CHLORIDE SERPL-SCNC: 114 MMOL/L (ref 98–112)
CO2 SERPL-SCNC: 22 MMOL/L (ref 21–32)
CREAT BLD-MCNC: 0.83 MG/DL
EOSINOPHIL # BLD AUTO: 0.01 X10(3) UL (ref 0–0.7)
EOSINOPHIL NFR BLD AUTO: 0.1 %
ERYTHROCYTE [DISTWIDTH] IN BLOOD BY AUTOMATED COUNT: 11.6 %
GFR SERPLBLD BASED ON 1.73 SQ M-ARVRAT: 71 ML/MIN/1.73M2 (ref 60–?)
GLUCOSE BLD-MCNC: 101 MG/DL (ref 70–99)
HCT VFR BLD AUTO: 35.3 %
HGB BLD-MCNC: 12 G/DL
IMM GRANULOCYTES # BLD AUTO: 0.04 X10(3) UL (ref 0–1)
IMM GRANULOCYTES NFR BLD: 0.6 %
LYMPHOCYTES # BLD AUTO: 0.7 X10(3) UL (ref 1–4)
LYMPHOCYTES NFR BLD AUTO: 10 %
MCH RBC QN AUTO: 33.4 PG (ref 26–34)
MCHC RBC AUTO-ENTMCNC: 34 G/DL (ref 31–37)
MCV RBC AUTO: 98.3 FL
MONOCYTES # BLD AUTO: 0.94 X10(3) UL (ref 0.1–1)
MONOCYTES NFR BLD AUTO: 13.4 %
NEUTROPHILS # BLD AUTO: 5.3 X10 (3) UL (ref 1.5–7.7)
NEUTROPHILS # BLD AUTO: 5.3 X10(3) UL (ref 1.5–7.7)
NEUTROPHILS NFR BLD AUTO: 75.6 %
OSMOLALITY SERPL CALC.SUM OF ELEC: 304 MOSM/KG (ref 275–295)
PLATELET # BLD AUTO: 230 10(3)UL (ref 150–450)
POTASSIUM SERPL-SCNC: 4.2 MMOL/L (ref 3.5–5.1)
RBC # BLD AUTO: 3.59 X10(6)UL
SODIUM SERPL-SCNC: 142 MMOL/L (ref 136–145)
WBC # BLD AUTO: 7 X10(3) UL (ref 4–11)

## 2022-08-11 RX ORDER — CEFAZOLIN SODIUM/WATER 2 G/20 ML
2 SYRINGE (ML) INTRAVENOUS EVERY 12 HOURS
Status: DISCONTINUED | OUTPATIENT
Start: 2022-08-11 | End: 2022-08-12

## 2022-08-11 NOTE — PLAN OF CARE
Patient is alert and oriented  . Follow commands and able to answer appropriately . Forgetful  at times . Hard of hearing with bilateral hearing aids . Resting in bed . Vital signs stable . Denies any chest pain or short of breath . Cooper intact  draining clear urine . Consulted Dr. Luna Buerger got order to continue rocephin daily . Iv infusing . Denies any pain . Will continue to monitor . All safety precautions in place . Reminded to \"call don't fall\".

## 2022-08-11 NOTE — CM/SW NOTE
Department  notified of request for rodrigo DAIGLE referrals started. Assigned CM/SW to follow up with pt/family on further discharge planning.      Jorje Appl  [facility]

## 2022-08-11 NOTE — PLAN OF CARE
Patient is alert and oriented x4 but with episodes of forgetfulness at times, repeats herself. VS are stable, zhao catheter draining. On IV Rocephin. Has bilat hearing aids. Seen by PT today and was recommended PILO. Safety precautions are in place and encouraged to call for any needs.

## 2022-08-12 VITALS
DIASTOLIC BLOOD PRESSURE: 74 MMHG | HEIGHT: 62 IN | BODY MASS INDEX: 27.6 KG/M2 | OXYGEN SATURATION: 96 % | SYSTOLIC BLOOD PRESSURE: 161 MMHG | TEMPERATURE: 99 F | WEIGHT: 150 LBS | HEART RATE: 84 BPM | RESPIRATION RATE: 18 BRPM

## 2022-08-12 PROCEDURE — 99497 ADVNCD CARE PLAN 30 MIN: CPT | Performed by: STUDENT IN AN ORGANIZED HEALTH CARE EDUCATION/TRAINING PROGRAM

## 2022-08-12 PROCEDURE — 99218 INITIAL OBSERVATION CARE,LEVL I: CPT | Performed by: STUDENT IN AN ORGANIZED HEALTH CARE EDUCATION/TRAINING PROGRAM

## 2022-08-12 RX ORDER — CEPHALEXIN 500 MG/1
500 CAPSULE ORAL 3 TIMES DAILY
Qty: 21 CAPSULE | Refills: 0 | Status: SHIPPED | OUTPATIENT
Start: 2022-08-12 | End: 2022-08-12

## 2022-08-12 RX ORDER — CEFAZOLIN SODIUM/WATER 2 G/20 ML
2 SYRINGE (ML) INTRAVENOUS EVERY 8 HOURS
Status: DISCONTINUED | OUTPATIENT
Start: 2022-08-12 | End: 2022-08-12

## 2022-08-12 RX ORDER — CEPHALEXIN 500 MG/1
500 CAPSULE ORAL 3 TIMES DAILY
Qty: 21 CAPSULE | Refills: 0 | Status: SHIPPED | OUTPATIENT
Start: 2022-08-12 | End: 2022-08-19

## 2022-08-12 RX ORDER — LISINOPRIL 10 MG/1
10 TABLET ORAL DAILY
Status: DISCONTINUED | OUTPATIENT
Start: 2022-08-12 | End: 2022-08-12

## 2022-08-12 NOTE — PLAN OF CARE
The patient is alert to person, place and date. Intermitted confusion with location and situation. Patient has zhao draining clear yellow urine. Zhao intact with leg strap. IV infusion to the left forearm, no signs of infiltration. Patient is up to the chair with walker and min assist.  Plan of care and discussion about antibiotics with patient at bedside. Patient understands she has an infection in her urine. No further questions or concerns at  This time.

## 2022-08-12 NOTE — CM/SW NOTE
Received voicemail from pt's dtr, Rosemarie Parra who stated pt/family have chosen [facility] for [employer]. Facility reserved in [address] [place] [address] Road. Spoke with RN who stated neuropsych consult pending. Will follow for their recommendations. / to remain available for support and/or discharge planning.      The [facility] at [address] [facility]  Discharge Planner  [phone]

## 2022-08-12 NOTE — CM/SW NOTE
Informed by MD that pt can discharge to NH today. Confirmed with Noemí that pt can be accepted for admission. Ambulance transport scheduled for 4:45pm.  PCS form completed and available for RN to print. Updated pt's dtr, Karan Toscano and also pt at bedside. Updated pt's RN. No further needs at this time. / to remain available for support and/or discharge planning.      The [address] [place] [address] at [address] [facility]  Discharge Planner  [phone]

## 2022-08-12 NOTE — PLAN OF CARE
NURSING DISCHARGE NOTE    Discharged Rehab facility via Ambulance. Accompanied by Family member and Support staff  Belongings Taken by patient/family. IV removed. Dc with [facility]. [facility] Family took patients belongings including bilateral hearing aids. Prescription for PO abx and printed AVS given to ambulance staff.

## 2022-08-12 NOTE — PLAN OF CARE
Assumed care of pt @ 0730. Pt is A/Ox 4, intermittent confusion, easily reoriented. On RA, VSS/  IV saline locked  Tolerating diet. PT/OT recommending BRETT  Pt denies pain  Up as tolerated with min assist and walker  Intake/outputs WNL. Plan dc to [facility] later today on PO abx    Updated POC with patient and family. Will continue to monitor. Problem: Patient/Family Goals  Goal: Patient/Family Long Term Goal  Description: Patient's Long Term Goal: back home and be independent     Interventions: continue antibiotics ,  zhao care .  Safety precautions   - See additional Care Plan goals for specific interventions  Outcome: Progressing  Goal: Patient/Family Short Term Goal  Description: Patient's Short Term Goal: . Able to go back home     Interventions: ID consult ,antibiotics ,iv fluids .    - See additional Care Plan goals for specific interventions  Outcome: Progressing     Problem: RISK FOR INFECTION - ADULT  Goal: Absence of fever/infection during anticipated neutropenic period  Description: INTERVENTIONS  - Monitor WBC  - Administer growth factors as ordered  - Implement neutropenic guidelines  Outcome: Progressing

## 2022-08-13 ENCOUNTER — NURSE ONLY (OUTPATIENT)
Dept: LAB | Age: 82
End: 2022-08-13
Attending: FAMILY MEDICINE
Payer: MEDICARE

## 2022-08-13 DIAGNOSIS — Z11.52 ENCOUNTER FOR SCREENING FOR COVID-19: Primary | ICD-10-CM

## 2022-08-14 LAB — SARS-COV-2 RNA RESP QL NAA+PROBE: DETECTED

## 2022-08-15 ENCOUNTER — INITIAL APN SNF VISIT (OUTPATIENT)
Dept: INTERNAL MEDICINE CLINIC | Age: 82
End: 2022-08-15

## 2022-08-15 DIAGNOSIS — F03.90 DEMENTIA WITHOUT BEHAVIORAL DISTURBANCE, UNSPECIFIED DEMENTIA TYPE (HCC): ICD-10-CM

## 2022-08-15 DIAGNOSIS — I10 PRIMARY HYPERTENSION: ICD-10-CM

## 2022-08-15 DIAGNOSIS — N39.0 RECURRENT UTI: ICD-10-CM

## 2022-08-15 DIAGNOSIS — Z78.9 DECREASED ACTIVITIES OF DAILY LIVING (ADL): ICD-10-CM

## 2022-08-15 DIAGNOSIS — N30.00 ACUTE CYSTITIS WITHOUT HEMATURIA: Primary | ICD-10-CM

## 2022-08-15 DIAGNOSIS — R53.1 WEAKNESS: ICD-10-CM

## 2022-08-15 DIAGNOSIS — R33.9 URINARY RETENTION: ICD-10-CM

## 2022-08-15 PROCEDURE — 1124F ACP DISCUSS-NO DSCNMKR DOCD: CPT | Performed by: NURSE PRACTITIONER

## 2022-08-15 PROCEDURE — 1111F DSCHRG MED/CURRENT MED MERGE: CPT | Performed by: NURSE PRACTITIONER

## 2022-08-15 PROCEDURE — 99310 SBSQ NF CARE HIGH MDM 45: CPT | Performed by: NURSE PRACTITIONER

## 2022-08-16 VITALS
HEART RATE: 77 BPM | OXYGEN SATURATION: 97 % | DIASTOLIC BLOOD PRESSURE: 74 MMHG | BODY MASS INDEX: 25 KG/M2 | RESPIRATION RATE: 18 BRPM | TEMPERATURE: 97 F | WEIGHT: 138 LBS | SYSTOLIC BLOOD PRESSURE: 144 MMHG

## 2022-08-18 ENCOUNTER — TELEPHONE (OUTPATIENT)
Dept: INTERNAL MEDICINE CLINIC | Age: 82
End: 2022-08-18

## 2022-08-18 NOTE — TELEPHONE ENCOUNTER
Notified by Care plan team that family needs additional letter for handling patients finances and housing. Neuropsych chris noted. \"Certainly at this time the patient demonstrates an impaired ability to make informed medical, legal and financial decisions on her own behalf. This should be deferred to an established power of , surrogate or guardian. \"      Letter completed and signed, given to family and nurse.        Sb GRACE

## 2022-08-23 ENCOUNTER — SNF VISIT (OUTPATIENT)
Dept: INTERNAL MEDICINE CLINIC | Age: 82
End: 2022-08-23

## 2022-08-23 VITALS
HEART RATE: 75 BPM | SYSTOLIC BLOOD PRESSURE: 142 MMHG | OXYGEN SATURATION: 94 % | RESPIRATION RATE: 18 BRPM | DIASTOLIC BLOOD PRESSURE: 67 MMHG | WEIGHT: 138 LBS | TEMPERATURE: 98 F | BODY MASS INDEX: 25 KG/M2

## 2022-08-23 DIAGNOSIS — R33.9 URINARY RETENTION: Primary | ICD-10-CM

## 2022-08-23 DIAGNOSIS — Z78.9 DECREASED ACTIVITIES OF DAILY LIVING (ADL): ICD-10-CM

## 2022-08-23 DIAGNOSIS — I10 PRIMARY HYPERTENSION: ICD-10-CM

## 2022-08-23 DIAGNOSIS — N39.0 RECURRENT UTI: ICD-10-CM

## 2022-08-23 DIAGNOSIS — F03.90 DEMENTIA WITHOUT BEHAVIORAL DISTURBANCE, UNSPECIFIED DEMENTIA TYPE (HCC): ICD-10-CM

## 2022-08-23 PROCEDURE — 1111F DSCHRG MED/CURRENT MED MERGE: CPT | Performed by: NURSE PRACTITIONER

## 2022-08-23 PROCEDURE — 99309 SBSQ NF CARE MODERATE MDM 30: CPT | Performed by: NURSE PRACTITIONER

## 2022-08-25 ENCOUNTER — TELEPHONE (OUTPATIENT)
Facility: LOCATION | Age: 82
End: 2022-08-25

## 2022-08-26 ENCOUNTER — SNF VISIT (OUTPATIENT)
Dept: INTERNAL MEDICINE CLINIC | Age: 82
End: 2022-08-26

## 2022-08-26 VITALS
TEMPERATURE: 97 F | OXYGEN SATURATION: 94 % | DIASTOLIC BLOOD PRESSURE: 74 MMHG | SYSTOLIC BLOOD PRESSURE: 169 MMHG | WEIGHT: 138.63 LBS | BODY MASS INDEX: 25 KG/M2 | RESPIRATION RATE: 18 BRPM | HEART RATE: 72 BPM

## 2022-08-26 DIAGNOSIS — N30.90 KLEBSIELLA CYSTITIS: Primary | ICD-10-CM

## 2022-08-26 DIAGNOSIS — I10 PRIMARY HYPERTENSION: ICD-10-CM

## 2022-08-26 DIAGNOSIS — F03.90 DEMENTIA WITHOUT BEHAVIORAL DISTURBANCE, UNSPECIFIED DEMENTIA TYPE (HCC): ICD-10-CM

## 2022-08-26 DIAGNOSIS — B96.1 KLEBSIELLA CYSTITIS: Primary | ICD-10-CM

## 2022-08-26 DIAGNOSIS — R33.9 URINARY RETENTION: ICD-10-CM

## 2022-08-26 DIAGNOSIS — Z78.9 DECREASED ACTIVITIES OF DAILY LIVING (ADL): ICD-10-CM

## 2022-08-26 PROCEDURE — 99309 SBSQ NF CARE MODERATE MDM 30: CPT | Performed by: NURSE PRACTITIONER

## 2022-08-26 PROCEDURE — 1111F DSCHRG MED/CURRENT MED MERGE: CPT | Performed by: NURSE PRACTITIONER

## 2022-08-30 ENCOUNTER — SNF VISIT (OUTPATIENT)
Dept: INTERNAL MEDICINE CLINIC | Age: 82
End: 2022-08-30

## 2022-08-30 DIAGNOSIS — R33.9 URINARY RETENTION: Primary | ICD-10-CM

## 2022-08-30 DIAGNOSIS — I10 PRIMARY HYPERTENSION: ICD-10-CM

## 2022-08-30 DIAGNOSIS — Z78.9 DECREASED ACTIVITIES OF DAILY LIVING (ADL): ICD-10-CM

## 2022-08-30 DIAGNOSIS — N30.00 ACUTE CYSTITIS WITHOUT HEMATURIA: ICD-10-CM

## 2022-08-30 DIAGNOSIS — N39.0 RECURRENT UTI: ICD-10-CM

## 2022-08-30 DIAGNOSIS — F03.90 DEMENTIA WITHOUT BEHAVIORAL DISTURBANCE, UNSPECIFIED DEMENTIA TYPE (HCC): ICD-10-CM

## 2022-08-30 PROCEDURE — 1111F DSCHRG MED/CURRENT MED MERGE: CPT | Performed by: NURSE PRACTITIONER

## 2022-08-30 PROCEDURE — 99316 NF DSCHRG MGMT 30 MIN+: CPT | Performed by: NURSE PRACTITIONER

## 2022-08-31 VITALS
WEIGHT: 138.63 LBS | DIASTOLIC BLOOD PRESSURE: 58 MMHG | HEART RATE: 91 BPM | BODY MASS INDEX: 25 KG/M2 | SYSTOLIC BLOOD PRESSURE: 141 MMHG | RESPIRATION RATE: 16 BRPM | OXYGEN SATURATION: 94 % | TEMPERATURE: 97 F

## 2022-08-31 RX ORDER — CEFADROXIL 500 MG/1
500 CAPSULE ORAL 2 TIMES DAILY
COMMUNITY
Start: 2022-08-25 | End: 2022-09-04

## 2022-08-31 RX ORDER — LISINOPRIL 10 MG/1
20 TABLET ORAL DAILY
COMMUNITY

## 2022-08-31 RX ORDER — ACETAMINOPHEN 325 MG/1
650 TABLET ORAL EVERY 6 HOURS PRN
COMMUNITY

## 2022-08-31 RX ORDER — SENNA AND DOCUSATE SODIUM 50; 8.6 MG/1; MG/1
2 TABLET, FILM COATED ORAL 2 TIMES DAILY PRN
COMMUNITY

## 2023-09-19 ENCOUNTER — LAB REQUISITION (OUTPATIENT)
Dept: LAB | Age: 83
End: 2023-09-19
Payer: MEDICARE

## 2023-09-19 DIAGNOSIS — R33.9 RETENTION OF URINE, UNSPECIFIED: ICD-10-CM

## 2023-09-19 LAB
BILIRUB UR QL STRIP.AUTO: NEGATIVE
COLOR UR AUTO: YELLOW
GLUCOSE UR STRIP.AUTO-MCNC: NORMAL MG/DL
KETONES UR STRIP.AUTO-MCNC: NEGATIVE MG/DL
LEUKOCYTE ESTERASE UR QL STRIP.AUTO: 500
PH UR STRIP.AUTO: 6.5 [PH] (ref 5–8)
PROT UR STRIP.AUTO-MCNC: 20 MG/DL
SP GR UR STRIP.AUTO: 1.01 (ref 1–1.03)
UROBILINOGEN UR STRIP.AUTO-MCNC: NORMAL MG/DL
WBC #/AREA URNS AUTO: >50 /HPF
WBC CLUMPS UR QL AUTO: PRESENT /HPF

## 2023-09-19 PROCEDURE — 87086 URINE CULTURE/COLONY COUNT: CPT

## 2023-09-19 PROCEDURE — 87088 URINE BACTERIA CULTURE: CPT

## 2023-09-19 PROCEDURE — 87186 SC STD MICRODIL/AGAR DIL: CPT

## 2023-09-19 PROCEDURE — 81001 URINALYSIS AUTO W/SCOPE: CPT

## 2024-01-23 ENCOUNTER — HOSPITAL ENCOUNTER (INPATIENT)
Facility: HOSPITAL | Age: 84
LOS: 3 days | Discharge: NURSING, REHAB OR LONG-TERM CARE | End: 2024-01-26
Attending: EMERGENCY MEDICINE | Admitting: INTERNAL MEDICINE
Payer: MEDICARE

## 2024-01-23 ENCOUNTER — APPOINTMENT (OUTPATIENT)
Dept: GENERAL RADIOLOGY | Facility: HOSPITAL | Age: 84
End: 2024-01-23
Attending: EMERGENCY MEDICINE
Payer: MEDICARE

## 2024-01-23 DIAGNOSIS — I44.1 HEART BLOCK AV SECOND DEGREE: Primary | ICD-10-CM

## 2024-01-23 DIAGNOSIS — R00.1 BRADYCARDIA: ICD-10-CM

## 2024-01-23 LAB
ALBUMIN SERPL-MCNC: 3.8 G/DL (ref 3.4–5)
ALBUMIN/GLOB SERPL: 1.2 {RATIO} (ref 1–2)
ALP LIVER SERPL-CCNC: 87 U/L
ANION GAP SERPL CALC-SCNC: 3 MMOL/L (ref 0–18)
APTT PPP: 31.9 SECONDS (ref 23.3–35.6)
AST SERPL-CCNC: 18 U/L (ref 15–37)
BASOPHILS # BLD AUTO: 0.05 X10(3) UL (ref 0–0.2)
BASOPHILS NFR BLD AUTO: 0.8 %
BILIRUB SERPL-MCNC: 0.3 MG/DL (ref 0.1–2)
BUN BLD-MCNC: 30 MG/DL (ref 9–23)
CALCIUM BLD-MCNC: 9.2 MG/DL (ref 8.5–10.1)
CHLORIDE SERPL-SCNC: 108 MMOL/L (ref 98–112)
CO2 SERPL-SCNC: 30 MMOL/L (ref 21–32)
CREAT BLD-MCNC: 1.12 MG/DL
EGFRCR SERPLBLD CKD-EPI 2021: 49 ML/MIN/1.73M2 (ref 60–?)
EOSINOPHIL # BLD AUTO: 0.15 X10(3) UL (ref 0–0.7)
EOSINOPHIL NFR BLD AUTO: 2.3 %
ERYTHROCYTE [DISTWIDTH] IN BLOOD BY AUTOMATED COUNT: 11.8 %
GLOBULIN PLAS-MCNC: 3.2 G/DL (ref 2.8–4.4)
GLUCOSE BLD-MCNC: 93 MG/DL (ref 70–99)
HCT VFR BLD AUTO: 36.2 %
HGB BLD-MCNC: 12.2 G/DL
IMM GRANULOCYTES # BLD AUTO: 0.03 X10(3) UL (ref 0–1)
IMM GRANULOCYTES NFR BLD: 0.5 %
INR BLD: 0.96 (ref 0.8–1.2)
LYMPHOCYTES # BLD AUTO: 1.58 X10(3) UL (ref 1–4)
LYMPHOCYTES NFR BLD AUTO: 23.7 %
MAGNESIUM SERPL-MCNC: 2.3 MG/DL (ref 1.6–2.6)
MCH RBC QN AUTO: 33.5 PG (ref 26–34)
MCHC RBC AUTO-ENTMCNC: 33.7 G/DL (ref 31–37)
MCV RBC AUTO: 99.5 FL
MONOCYTES # BLD AUTO: 0.72 X10(3) UL (ref 0.1–1)
MONOCYTES NFR BLD AUTO: 10.8 %
NEUTROPHILS # BLD AUTO: 4.13 X10 (3) UL (ref 1.5–7.7)
NEUTROPHILS # BLD AUTO: 4.13 X10(3) UL (ref 1.5–7.7)
NEUTROPHILS NFR BLD AUTO: 61.9 %
NT-PROBNP SERPL-MCNC: 319 PG/ML (ref ?–450)
OSMOLALITY SERPL CALC.SUM OF ELEC: 298 MOSM/KG (ref 275–295)
PLATELET # BLD AUTO: 249 10(3)UL (ref 150–450)
POTASSIUM SERPL-SCNC: 3.7 MMOL/L (ref 3.5–5.1)
PROT SERPL-MCNC: 7 G/DL (ref 6.4–8.2)
PROTHROMBIN TIME: 12.7 SECONDS (ref 11.6–14.8)
RBC # BLD AUTO: 3.64 X10(6)UL
SODIUM SERPL-SCNC: 141 MMOL/L (ref 136–145)
TROPONIN I SERPL HS-MCNC: 18 NG/L
TSI SER-ACNC: 0.76 MIU/ML (ref 0.36–3.74)
WBC # BLD AUTO: 6.7 X10(3) UL (ref 4–11)

## 2024-01-23 PROCEDURE — 85730 THROMBOPLASTIN TIME PARTIAL: CPT | Performed by: EMERGENCY MEDICINE

## 2024-01-23 PROCEDURE — 85025 COMPLETE CBC W/AUTO DIFF WBC: CPT | Performed by: EMERGENCY MEDICINE

## 2024-01-23 PROCEDURE — 83735 ASSAY OF MAGNESIUM: CPT | Performed by: EMERGENCY MEDICINE

## 2024-01-23 PROCEDURE — 83880 ASSAY OF NATRIURETIC PEPTIDE: CPT

## 2024-01-23 PROCEDURE — 99285 EMERGENCY DEPT VISIT HI MDM: CPT

## 2024-01-23 PROCEDURE — 71045 X-RAY EXAM CHEST 1 VIEW: CPT | Performed by: EMERGENCY MEDICINE

## 2024-01-23 PROCEDURE — 93010 ELECTROCARDIOGRAM REPORT: CPT

## 2024-01-23 PROCEDURE — 80053 COMPREHEN METABOLIC PANEL: CPT | Performed by: EMERGENCY MEDICINE

## 2024-01-23 PROCEDURE — 99291 CRITICAL CARE FIRST HOUR: CPT

## 2024-01-23 PROCEDURE — 85610 PROTHROMBIN TIME: CPT | Performed by: EMERGENCY MEDICINE

## 2024-01-23 PROCEDURE — 36415 COLL VENOUS BLD VENIPUNCTURE: CPT

## 2024-01-23 PROCEDURE — 93005 ELECTROCARDIOGRAM TRACING: CPT

## 2024-01-23 PROCEDURE — 84484 ASSAY OF TROPONIN QUANT: CPT | Performed by: EMERGENCY MEDICINE

## 2024-01-23 PROCEDURE — 84443 ASSAY THYROID STIM HORMONE: CPT | Performed by: EMERGENCY MEDICINE

## 2024-01-23 RX ORDER — SENNOSIDES 8.6 MG
17.2 TABLET ORAL NIGHTLY PRN
Status: DISCONTINUED | OUTPATIENT
Start: 2024-01-23 | End: 2024-01-26

## 2024-01-23 RX ORDER — ALPRAZOLAM 0.25 MG/1
0.25 TABLET ORAL NIGHTLY PRN
Status: DISCONTINUED | OUTPATIENT
Start: 2024-01-23 | End: 2024-01-26

## 2024-01-23 RX ORDER — METHENAMINE HIPPURATE 1000 MG/1
1 TABLET ORAL 2 TIMES DAILY
COMMUNITY
Start: 2022-10-13

## 2024-01-23 RX ORDER — SPIRONOLACTONE 25 MG/1
12.5 TABLET ORAL DAILY
Status: DISCONTINUED | OUTPATIENT
Start: 2024-01-24 | End: 2024-01-26

## 2024-01-23 RX ORDER — SPIRONOLACTONE 25 MG/1
12.5 TABLET ORAL DAILY
COMMUNITY
Start: 2024-01-12

## 2024-01-23 RX ORDER — MELATONIN
3 NIGHTLY PRN
Status: DISCONTINUED | OUTPATIENT
Start: 2024-01-23 | End: 2024-01-26

## 2024-01-23 RX ORDER — ENOXAPARIN SODIUM 100 MG/ML
30 INJECTION SUBCUTANEOUS DAILY
Status: DISCONTINUED | OUTPATIENT
Start: 2024-01-24 | End: 2024-01-25

## 2024-01-23 RX ORDER — ALPRAZOLAM 0.25 MG/1
0.25 TABLET ORAL NIGHTLY PRN
COMMUNITY

## 2024-01-23 RX ORDER — BISACODYL 10 MG
10 SUPPOSITORY, RECTAL RECTAL
Status: DISCONTINUED | OUTPATIENT
Start: 2024-01-23 | End: 2024-01-26

## 2024-01-23 RX ORDER — FUROSEMIDE 40 MG/1
40 TABLET ORAL DAILY
Status: ON HOLD | COMMUNITY
Start: 2024-01-12 | End: 2024-01-29

## 2024-01-23 RX ORDER — ACETAMINOPHEN 500 MG
500 TABLET ORAL EVERY 4 HOURS PRN
Status: DISCONTINUED | OUTPATIENT
Start: 2024-01-23 | End: 2024-01-26

## 2024-01-23 RX ORDER — POLYETHYLENE GLYCOL 3350 17 G/17G
17 POWDER, FOR SOLUTION ORAL DAILY PRN
Status: DISCONTINUED | OUTPATIENT
Start: 2024-01-23 | End: 2024-01-26

## 2024-01-23 RX ORDER — FUROSEMIDE 40 MG/1
40 TABLET ORAL DAILY
Status: DISCONTINUED | OUTPATIENT
Start: 2024-01-24 | End: 2024-01-26

## 2024-01-23 RX ORDER — IBUPROFEN 200 MG
200 TABLET ORAL EVERY 6 HOURS PRN
COMMUNITY
End: 2024-01-26

## 2024-01-23 RX ORDER — CALCIUM CARBONATE 500 MG/1
1 TABLET, CHEWABLE ORAL DAILY
COMMUNITY

## 2024-01-23 RX ORDER — LISINOPRIL 40 MG/1
40 TABLET ORAL DAILY
Status: DISCONTINUED | OUTPATIENT
Start: 2024-01-24 | End: 2024-01-26

## 2024-01-23 NOTE — CONSULTS
[facility]    Report of Consultation    Evelyn Reeves Patient Status:  Emergency    1940 MRN [id number]   Location [facility] EMERGENCY DEPARTMENT Attending Steffen Bee,    Hosp Day # 0 PCP Brendan Love MD     Date of Admission:  2024  Date of Consult:  2024      Reason for Consultation:  Heart block     History of Present Illness:  Evelyn Reeves is a a(n) 83 year old female with history of HTN who presents today with a heart block. She was told at her skilled nursing facility that she was bradycardic.  Facility wanted an EKG. Was establishing care with cardiology earlier this afternoon when she was found to be be in 2:1 AVB. Has recently been treated by her primary care provider for LE edema with ingrid and lasix. Overall, she is feeling well. Has no concerns.  Denies chest pain, palpitations, dizziness, dyspnea. Feels her LE edema is improved.     History:  Past Medical History:   Diagnosis Date    Abdominal hernia     Abdominal pain     Anemia     Anxiety     Arthritis     Atypical mole     Back pain     Bad breath     Bloating     Blurred vision     Constipation     Depressed state     never required treatment    Diarrhea, unspecified     Easy bruising     Enlarged lymph node     Flatulence/gas pain/belching     Frequent use of laxatives     GERD (gastroesophageal reflux disease)     Dr. Valencia EGS adn colosncopy 2010    Hearing loss     Hemorrhoids     History of cardiac murmur     Hoarseness, chronic     per patient from Allergies    Indigestion     Irregular bowel habits     Migraine headache     Nausea     Night sweats     Osteoporosis     Pain in joints     Pain with bowel movements     Painful urination     Presence of other cardiac implants and grafts     2 hip replacements    Stress     Uncomfortable fullness after meals     Urinary retention     Weight loss      Past Surgical History:   Procedure Laterality Date    COLONOSCOPY N/A 2021     Procedure: colonoscopy;  Surgeon: Reagan Watt MD;  Location:  ENDOSCOPY    DRAIN/INJECT LARGE JOINT/BURSA  8/22/2013    Procedure: HIP INJECTION (PAIN);  Surgeon: Julián Suarez MD;  Location: [facility] PAIN MANAGEMENT    FLUOROSCOPIC GUIDANCE NEEDLE PLACEMENT  8/22/2013    Procedure: HIP INJECTION (PAIN);  Surgeon: Julián Suarez MD;  Location: [facility] PAIN MANAGEMENT    HIP REPLACEMENT SURGERY      bilateral   Dr turner  right 2015  left 2016     OTHER      R THR    OTHER SURGICAL HISTORY      hernia 1968    OTHER SURGICAL HISTORY      breast biopsy 1980    OTHER SURGICAL HISTORY  04/25/2014    flow us- Dr. Ron    OTHER SURGICAL HISTORY  07/01/2019    cysto dr moreno    OTHER SURGICAL HISTORY  01/12/2022    cystoscopy-     PATIENT DOCUMENTED NOT TO HAVE EXPERIENCED ANY OF THE FOLLOWING EVENTS  8/22/2013    Procedure: HIP INJECTION (PAIN);  Surgeon: Julián Suarez MD;  Location: [facility] PAIN MANAGEMENT    PATIENT WITHOUGH PREOPERATIVE ORDER FOR IV ANTIBIOTIC SURGICAL SITE INFECTION PROPHYLAXIS.  8/22/2013    Procedure: HIP INJECTION (PAIN);  Surgeon: Julián Suarez MD;  Location: [facility] PAIN MANAGEMENT    TONSILLECTOMY       Family History   Problem Relation Age of Onset    Cancer Father         prostate and throat    Heart Disorder Mother     Heart Attack Mother     Other (Parkinson's Dz) Mother 57      reports that she has never smoked. She has never used smokeless tobacco. She reports current alcohol use of about 5.0 standard drinks of alcohol per week. She reports that she does not use drugs.    Allergies:  Allergies   Allergen Reactions    Levaquin [Levofloxacin] PAIN     Tendonitis of the Achilles tendons mild    Adhesive Tape OTHER (SEE COMMENTS)     Welts    Neosporin Original [Neomycin-Bacitracin-Polymyxin] UNKNOWN     Pt reports she was not sure of the reaction, it was 15 yrs ago.  Tolerates polysporin    Sulfa Antibiotics UNKNOWN     \"Did not feel good.\"  Daughters do not believe patient is allergic. Bactrim taken many times according to med history.       Medications:  No current facility-administered medications for this encounter.    Review of Systems:  All systems were reviewed and are negative except as described above in HPI.    Physical Exam:  Blood pressure 159/74, pulse (!) 40, temperature 98.5 °F (36.9 °C), temperature source Temporal, resp. rate 18, height 5' 1\" (1.549 m), weight 147 lb (66.7 kg), SpO2 99%, not currently breastfeeding.  Temp (24hrs), Av.5 °F (36.9 °C), Min:98.5 °F (36.9 °C), Max:98.5 °F (36.9 °C)    Wt Readings from Last 3 Encounters:   24 147 lb (66.7 kg)   22 138 lb 9.6 oz (62.9 kg)   22 138 lb 9.6 oz (62.9 kg)       Telemetry: Second degree avb    Physical Exam  Vitals reviewed.   Constitutional:       General: She is not in acute distress.     Appearance: Normal appearance. She is well-developed and well-groomed. She is not ill-appearing, toxic-appearing or diaphoretic.   Cardiovascular:      Rate and Rhythm: Regular rhythm. Bradycardia present.      Heart sounds: No murmur heard.  Pulmonary:      Effort: Pulmonary effort is normal.      Breath sounds: Normal breath sounds.   Musculoskeletal:      Right lower leg: Edema (trace) present.      Left lower leg: Edema (trace) present.   Skin:     General: Skin is warm and dry.      Capillary Refill: Capillary refill takes less than 2 seconds.   Neurological:      General: No focal deficit present.      Mental Status: She is alert and oriented to person, place, and time.   Psychiatric:         Attention and Perception: Attention normal.         Mood and Affect: Mood normal.         Behavior: Behavior is cooperative.         Laboratories and Data:  Diagnostics:  [id number]  Sinus rhythm with 2nd degree A-V block with 2:1 A-V conduction   Possible Left atrial enlargement   Right bundle branch block   Septal infarct (cited on or before 2022)   Abnormal ECG    When compared with ECG of 10-AUG-2022 13:14,   Sinus rhythm is now with 2nd degree A-V block   Vent. 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 Yes      Genet Farr, APRN  1/23/2024  4:01 PM

## 2024-01-23 NOTE — ED QUICK NOTES
Per daughters request this writer with Dorene YO changed patients zhao leg bag, provided daughter with leg bag to take with them.

## 2024-01-23 NOTE — ED QUICK NOTES
Orders for admission, patient is aware of plan and ready to go upstairs. Any questions, please call ED [facility] at [place] [phone].     Patient Covid vaccination status: Unvaccinated     COVID Test Ordered in ED: None    COVID Suspicion at Admission: N/A    Running Infusions:  None    Mental Status/LOC at time of transport: x2    Other pertinent information:   CIWA score: N/A   NIH score:  N/A

## 2024-01-23 NOTE — ED INITIAL ASSESSMENT (HPI)
Patient to ED after outpatient EKG showed 2nd degree heart block, patient states she has been more fatigued.

## 2024-01-24 ENCOUNTER — APPOINTMENT (OUTPATIENT)
Dept: CV DIAGNOSTICS | Facility: HOSPITAL | Age: 84
End: 2024-01-24
Attending: INTERNAL MEDICINE
Payer: MEDICARE

## 2024-01-24 LAB
ANION GAP SERPL CALC-SCNC: 1 MMOL/L (ref 0–18)
ATRIAL RATE: 83 BPM
BASOPHILS # BLD AUTO: 0.06 X10(3) UL (ref 0–0.2)
BASOPHILS NFR BLD AUTO: 1.1 %
BUN BLD-MCNC: 28 MG/DL (ref 9–23)
CALCIUM BLD-MCNC: 8.8 MG/DL (ref 8.5–10.1)
CHLORIDE SERPL-SCNC: 114 MMOL/L (ref 98–112)
CO2 SERPL-SCNC: 28 MMOL/L (ref 21–32)
CREAT BLD-MCNC: 0.91 MG/DL
EGFRCR SERPLBLD CKD-EPI 2021: 63 ML/MIN/1.73M2 (ref 60–?)
EOSINOPHIL # BLD AUTO: 0.18 X10(3) UL (ref 0–0.7)
EOSINOPHIL NFR BLD AUTO: 3.3 %
ERYTHROCYTE [DISTWIDTH] IN BLOOD BY AUTOMATED COUNT: 11.9 %
GLUCOSE BLD-MCNC: 95 MG/DL (ref 70–99)
HCT VFR BLD AUTO: 32 %
HGB BLD-MCNC: 10.8 G/DL
IMM GRANULOCYTES # BLD AUTO: 0.01 X10(3) UL (ref 0–1)
IMM GRANULOCYTES NFR BLD: 0.2 %
LYMPHOCYTES # BLD AUTO: 1.6 X10(3) UL (ref 1–4)
LYMPHOCYTES NFR BLD AUTO: 29.3 %
MCH RBC QN AUTO: 33.6 PG (ref 26–34)
MCHC RBC AUTO-ENTMCNC: 33.8 G/DL (ref 31–37)
MCV RBC AUTO: 99.7 FL
MONOCYTES # BLD AUTO: 0.65 X10(3) UL (ref 0.1–1)
MONOCYTES NFR BLD AUTO: 11.9 %
NEUTROPHILS # BLD AUTO: 2.97 X10 (3) UL (ref 1.5–7.7)
NEUTROPHILS # BLD AUTO: 2.97 X10(3) UL (ref 1.5–7.7)
NEUTROPHILS NFR BLD AUTO: 54.2 %
OSMOLALITY SERPL CALC.SUM OF ELEC: 301 MOSM/KG (ref 275–295)
P AXIS: 65 DEGREES
P-R INTERVAL: 244 MS
PLATELET # BLD AUTO: 213 10(3)UL (ref 150–450)
POTASSIUM SERPL-SCNC: 4.4 MMOL/L (ref 3.5–5.1)
Q-T INTERVAL: 480 MS
QRS DURATION: 136 MS
QTC CALCULATION (BEZET): 396 MS
R AXIS: 115 DEGREES
RBC # BLD AUTO: 3.21 X10(6)UL
SODIUM SERPL-SCNC: 143 MMOL/L (ref 136–145)
T AXIS: 41 DEGREES
VENTRICULAR RATE: 41 BPM
WBC # BLD AUTO: 5.5 X10(3) UL (ref 4–11)

## 2024-01-24 PROCEDURE — 93306 TTE W/DOPPLER COMPLETE: CPT | Performed by: INTERNAL MEDICINE

## 2024-01-24 PROCEDURE — 80048 BASIC METABOLIC PNL TOTAL CA: CPT | Performed by: HOSPITALIST

## 2024-01-24 PROCEDURE — 85025 COMPLETE CBC W/AUTO DIFF WBC: CPT | Performed by: HOSPITALIST

## 2024-01-24 RX ORDER — CHLORHEXIDINE GLUCONATE 4 G/100ML
30 SOLUTION TOPICAL
Status: COMPLETED | OUTPATIENT
Start: 2024-01-25 | End: 2024-01-25

## 2024-01-24 RX ORDER — SODIUM CHLORIDE 9 MG/ML
INJECTION, SOLUTION INTRAVENOUS
Status: ACTIVE | OUTPATIENT
Start: 2024-01-25 | End: 2024-01-25

## 2024-01-24 RX ORDER — CEFAZOLIN SODIUM/WATER 2 G/20 ML
2 SYRINGE (ML) INTRAVENOUS
Status: COMPLETED | OUTPATIENT
Start: 2024-01-24 | End: 2024-01-25

## 2024-01-24 NOTE — ED PROVIDER NOTES
Patient Seen in: [facility]      History     Chief Complaint   Patient presents with    Arrythmia/Palpitations     Stated Complaint: 2nd degree Heart Block-from [facility]    Subjective:   HPI    This is a 83-year-old female presents emergency room from [facility] cardiology clinic for admission secondary to heart block, patient reportedly went to cardiology clinic because she was told at her nursing home she was bradycardic, patient went to cardiologist had EKG performed which revealed 2-1 AV block.  Patient states that she otherwise has been feeling well, complains of some generalized weakness.  Denies chest pain or shortness of breath and denies any fevers or chills.  Patient has had lower extremity edema which has been treated with diuretics by primary care which she feels is improving.  Currently patient is symptom-free.    Objective:   Past Medical History:   Diagnosis Date    Abdominal hernia     Abdominal pain     Anemia     Anxiety     Arthritis     Atypical mole     Back pain     Bad breath     Bloating     Blurred vision     Constipation     Depressed state     never required treatment    Diarrhea, unspecified     Easy bruising     Enlarged lymph node     Flatulence/gas pain/belching     Frequent use of laxatives     GERD (gastroesophageal reflux disease)     Dr. Valencia EGS adn colosncopy 2010    Hearing loss     Hemorrhoids     History of cardiac murmur     Hoarseness, chronic     per patient from Allergies    Indigestion     Irregular bowel habits     Migraine headache     Nausea     Night sweats     Osteoporosis     Pain in joints     Pain with bowel movements     Painful urination     Presence of other cardiac implants and grafts     2 hip replacements    Stress     Uncomfortable fullness after meals     Urinary retention     Weight loss               Past Surgical History:   Procedure Laterality Date    COLONOSCOPY N/A 4/8/2021    Procedure: colonoscopy;  Surgeon: Reagan Watt MD;   Location:  ENDOSCOPY    DRAIN/INJECT LARGE JOINT/BURSA  8/22/2013    Procedure: HIP INJECTION (PAIN);  Surgeon: Julián Suarez MD;  Location: [facility]    FLUOROSCOPIC GUIDANCE NEEDLE PLACEMENT  8/22/2013    Procedure: HIP INJECTION (PAIN);  Surgeon: Julián Suarez MD;  Location: [facility]    HIP REPLACEMENT SURGERY      bilateral   Dr turner  right 2015  left 2016     OTHER      R THR    OTHER SURGICAL HISTORY      hernia 1968    OTHER SURGICAL HISTORY      breast biopsy 1980    OTHER SURGICAL HISTORY  04/25/2014    flow [facility]- Dr. Ron    OTHER SURGICAL HISTORY  07/01/2019    cysto dr moreno    OTHER SURGICAL HISTORY  01/12/2022    cystoscopy-     PATIENT DOCUMENTED NOT TO HAVE EXPERIENCED ANY OF THE FOLLOWING EVENTS  8/22/2013    Procedure: HIP INJECTION (PAIN);  Surgeon: Julián Suarez MD;  Location: [facility]    PATIENT WITHOUGH PREOPERATIVE ORDER FOR IV ANTIBIOTIC SURGICAL SITE INFECTION PROPHYLAXIS.  8/22/2013    Procedure: HIP INJECTION (PAIN);  Surgeon: Julián Suarez MD;  Location: [facility] PAIN MANAGEMENT    TONSILLECTOMY                  Social History     Socioeconomic History    Marital status:     Number of children: 5   Occupational History    Occupation: homemaker   Tobacco Use    Smoking status: Never    Smokeless tobacco: Never   Vaping Use    Vaping Use: Never used   Substance and Sexual Activity    Alcohol use: Yes     Alcohol/week: 5.0 standard drinks of alcohol     Types: 3 Glasses of wine, 2 Shots of liquor per week    Drug use: No    Sexual activity: Not Currently     Partners: Male     Social Determinants of Health     Food Insecurity: No Food Insecurity (1/23/2024)    Food Insecurity     Food Insecurity: Never true   Transportation Needs: No Transportation Needs (1/23/2024)    Transportation Needs     Lack of Transportation: No   Housing Stability: Low Risk  (1/23/2024)    Housing Stability     Housing  Instability: No              Review of Systems    Positive for stated complaint: 2nd degree Heart Block-from [facility]  Other systems are as noted in HPI.  Constitutional and vital signs reviewed.      All other systems reviewed and negative except as noted above.    Physical Exam     ED Triage Vitals [01/23/24 1519]   /74   Pulse (!) 40   Resp 18   Temp 98.5 °F (36.9 °C)   Temp src Temporal   SpO2 99 %   O2 Device None (Room air)       Current:BP (!) 184/43 (BP Location: Left arm)   Pulse 50   Temp 98.3 °F (36.8 °C) (Oral)   Resp 16   Ht 154.9 cm (5' 1\")   Wt 66.7 kg   SpO2 96%   BMI 27.78 kg/m²         Physical Exam    GENERAL: Patient is awake, alert,  in no acute distress.  HEENT:  no scleral icterus.  Mucous membranes are moist  HEART: Bradycardic regular rhythm   LUNGS: Clear to auscultation bilaterally.  No Rales, no rhonchi, no wheezing, no stridor.  ABDOMEN: Soft, nondistended,non tender  EXTREMITIES: Trace bilateral pretibial edema, no calf tenderness, dorsal pedal pulses present and equal bilaterally.    ED Course     Labs Reviewed   COMP METABOLIC PANEL (14) - Abnormal; Notable for the following components:       Result Value    BUN 30 (*)     Creatinine 1.12 (*)     Calculated Osmolality 298 (*)     eGFR-Cr 49 (*)     All other components within normal limits   CBC W/ DIFFERENTIAL - Abnormal; Notable for the following components:    RBC 3.64 (*)     All other components within normal limits   TROPONIN I HIGH SENSITIVITY - Normal   PROTHROMBIN TIME (PT) - Normal   PTT, ACTIVATED - Normal   TSH W REFLEX TO FREE T4 - Normal   MAGNESIUM - Normal   PRO BETA NATRIURETIC PEPTIDE - Normal   CBC WITH DIFFERENTIAL WITH PLATELET    Narrative:     The following orders were created for panel order CBC With Differential With Platelet.  Procedure                               Abnormality         Status                     ---------                               -----------         ------                      CBC W/ DIFFERENTIAL[[id number]]          Abnormal            Final result                 Please view results for these tests on the individual orders.   RAINBOW DRAW LAVENDER   RAINBOW DRAW LIGHT GREEN   RAINBOW DRAW BLUE   RAINBOW DRAW GOLD     EKG    Rate, intervals and axes as noted on EKG Report.  Rate: 41  Rhythm: Sinus Rhythm  Reading: Sinus bradycardia, second-degree type II              A total of 35 minutes of critical care time (exclusive of billable procedures) was administered to manage the patient's cardiovascular instability due to her second-degree type II AV block.  This involved direct patient intervention, complex decision making, and/or extensive discussions with the patient, family, and clinical staff.           MDM        Differential diagnosis before testing includes but not limited to second-degree type I block, second-degree type II block, complete heart block, electrolyte abnormality, which is a medical condition that poses a threat to life/function      Radiographic images  I personally reviewed the radiographs and my individual interpretation shows chest x-ray no pneumothorax  I also reviewed the official reports that showed chest x-ray stable cardiac and mediastinal contours.    Discussion of management (consult/physicians, social work, pharmacy,ect) discussed with franco cardiology Dr. Gonzalez and franco hospitalist    Course of Events during Emergency Room Visit include patient placed on cardiac monitor and pulse ox.  Pacer pads placed on patient.  CBC and chemistry are unremarkable, troponin is 18.  Magnesium 2.3.  Thyroid functions unremarkable.  Discussed with cardiologist Dr. Gonzalez who reviewed EKG, is unclear if this is a second-degree type I or II ,patient is hemodynamically stable and recommends patient be admitted to [facility] not ICU at this time.  Discussed with hospitalist physician.  Discussed plan with patient and family are in agreement.    Shared decision making was  utilized           Disposition:    Admission  I have discussed with the patient the results of test, differential diagnosis, and treatment plan. They expressed clear understanding of these instructions and agrees to the plan provided.         Note to patient: The 21st Century Cures Act makes medical notes like these available to patients in the interest of transparency. However, this is a medical document intended as peer to peer communication. It is written in medical language and may contain abbreviations or verbiage that are unfamiliar. It may appear blunt or direct. Medical documents are intended to carry relevant information, facts as evident, and the clinical opinion of the practitioner.           Admission disposition: 1/23/2024  4:52 PM                                        Medical Decision Making      Disposition and Plan     Clinical Impression:  1. Heart block AV second degree    2. Bradycardia         Disposition:  Admit  1/23/2024  4:52 pm    Follow-up:  No follow-up provider specified.        Medications Prescribed:  Current Discharge Medication List                            Hospital Problems       Present on Admission  Date Reviewed: 7/28/2022            ICD-10-CM Noted POA    * (Principal) Heart block AV second degree I44.1 1/23/2024 Unknown    Bradycardia R00.1 1/23/2024 Unknown

## 2024-01-24 NOTE — PROGRESS NOTES
[facility] Hospitalist Progress Note                                                                     [facility]      Evelyn Reeves  2/25/1940    SUBJECTIVE: no chest pain, palpitations, shortness of breath, cough, nausea, vomiting, abdominal pain. No dizziness or weakness.     OBJECTIVE:  Temp:  [97.9 °F (36.6 °C)-98.6 °F (37 °C)] 98.6 °F (37 °C)  Pulse:  [37-68] 44  Resp:  [14-19] 16  BP: (109-184)/(35-84) 119/50  SpO2:  [92 %-99 %] 97 %  Exam  Gen: No acute distress, alert and oriented x3  Pulm: Lungs clear bilaterally, normal respiratory effort, no crackles, no wheezing  CV: Bradycardic, no murmur.  Abd: Abdomen soft, nontender, nondistended, bowel sounds present  MSK: No significant pitting edema or tenderness of the LE  Skin: no rashes or lesions    Labs:   Recent Labs   Lab 01/23/24  1539 01/24/24  0542   WBC 6.7 5.5   HGB 12.2 10.8*   MCV 99.5 99.7   .0 213.0   INR 0.96  --        Recent Labs   Lab 01/23/24  1539 01/24/24  0542    143   K 3.7 4.4    114*   CO2 30.0 28.0   BUN 30* 28*   CREATSERUM 1.12* 0.91   CA 9.2 8.8   MG 2.3  --    GLU 93 95       Recent Labs   Lab 01/23/24  1539   AST 18   ALB 3.8       No results for input(s): \"PGLU\" in the last 168 hours.    Meds:   Scheduled:    furosemide  40 mg Oral Daily    lisinopril  40 mg Oral Daily    spironolactone  12.5 mg Oral Daily    enoxaparin  30 mg Subcutaneous Daily     Continuous Infusions:   PRN: ALPRAZolam, magnesium hydroxide, acetaminophen, melatonin, polyethylene glycol (PEG 3350), sennosides, bisacodyl    ASSESSMENT / PLAN:      83 year old female with PMH sig for htn, chf, dementia per family, here for low HR     Impression     -bradycardia / 2nd degree AVB  -chronic CHF   -HTN  -dementia     Plan     *CV  -current HR in low 40s, pt asymptomatic  -Tsh ok   -Echo pending  -EP eval / cards eval pending  -Tele     *chronic CHF  -cont laisx / aldactone per cards  -Does not  appear grossly overloaded     *HTN  -on lisinopril, conitnued     Scds  Lovenox subcutaneous    Dispo: no discharge    Kapil MD Flor  [facility]  [phone]

## 2024-01-24 NOTE — PROGRESS NOTES
[facility] Pharmacy Note:  Renal Dose Adjustment for enoxaparin (LOVENOX)    Evelyn Reeves has been prescribed enoxaparin 40 mg subcutaneously every 24 hours.    Estimated Creatinine Clearance: 29.2 mL/min (A) (based on SCr of 1.12 mg/dL (H)).    Calculated CrCl 20 to 30 mL/min so the dose of Enoxaparin (LOVENOX) has been changed to enoxaparin 30 mg every 24 hours per P&T approved protocol.  Pharmacy will continue to follow, and make additional adjustments if needed.      Thank you,  Karan Ceja, PharmD  1/23/2024 9:13 PM

## 2024-01-24 NOTE — CONSULTS
[facility]    Progress note     Evelyn Reeves Patient Status:  Emergency    1940 MRN [id number]   Location [facility] EMERGENCY DEPARTMENT Attending Steffen Bee DO   Hosp Day # 1 PCP Brendan Love MD     Date of Admission:  2024  Date of Consult:  2024    Subjective: Remains in heart block. Feels well, without any CV complaint.     History of Present Illness:  Evelyn Reeves is a a(n) 83 year old female with history of HTN who presents today with a heart block. She was told at her skilled nursing facility that she was bradycardic.  Facility wanted an EKG. Was establishing care with cardiology earlier this afternoon when she was found to be be in 2:1 AVB with a rate 40.  She also has an underlying RBBB and 1st degree AVB by clinic EKG.   Has recently been treated by her primary care provider for LE edema with ingrid and lasix. Overall, she is feeling well. Has no concerns.  Denies chest pain, palpitations, dizziness, dyspnea. Feels her LE edema is improved.     History:  Past Medical History:   Diagnosis Date    Abdominal hernia     Abdominal pain     Anemia     Anxiety     Arthritis     Atypical mole     Back pain     Bad breath     Bloating     Blurred vision     Constipation     Depressed state     never required treatment    Diarrhea, unspecified     Easy bruising     Enlarged lymph node     Flatulence/gas pain/belching     Frequent use of laxatives     GERD (gastroesophageal reflux disease)     Dr. Valencia EGS adn colosncopy 2010    Hearing loss     Hemorrhoids     History of cardiac murmur     Hoarseness, chronic     per patient from Allergies    Indigestion     Irregular bowel habits     Migraine headache     Nausea     Night sweats     Osteoporosis     Pain in joints     Pain with bowel movements     Painful urination     Presence of other cardiac implants and grafts     2 hip replacements    Stress     Uncomfortable fullness after meals     Urinary retention      Weight loss      Past Surgical History:   Procedure Laterality Date    COLONOSCOPY N/A 4/8/2021    Procedure: colonoscopy;  Surgeon: Reagan Watt MD;  Location:  ENDOSCOPY    DRAIN/INJECT LARGE JOINT/BURSA  8/22/2013    Procedure: HIP INJECTION (PAIN);  Surgeon: Julián Suarez MD;  Location: [facility]    FLUOROSCOPIC GUIDANCE NEEDLE PLACEMENT  8/22/2013    Procedure: HIP INJECTION (PAIN);  Surgeon: Julián Suarez MD;  Location: [facility] PAIN MANAGEMENT    HIP REPLACEMENT SURGERY      bilateral   Dr turner  right 2015  left 2016     OTHER      R THR    OTHER SURGICAL HISTORY      hernia 1968    OTHER SURGICAL HISTORY      breast biopsy 1980    OTHER SURGICAL HISTORY  04/25/2014    flow [facility]- Dr. Ron    OTHER SURGICAL HISTORY  07/01/2019    cysto dr moreno    OTHER SURGICAL HISTORY  01/12/2022    cystoscopy-     PATIENT DOCUMENTED NOT TO HAVE EXPERIENCED ANY OF THE FOLLOWING EVENTS  8/22/2013    Procedure: HIP INJECTION (PAIN);  Surgeon: Julián Suarez MD;  Location: [facility] PAIN MANAGEMENT    PATIENT WITHOUGH PREOPERATIVE ORDER FOR IV ANTIBIOTIC SURGICAL SITE INFECTION PROPHYLAXIS.  8/22/2013    Procedure: HIP INJECTION (PAIN);  Surgeon: Julián Suarez MD;  Location: [facility] PAIN MANAGEMENT    TONSILLECTOMY       Family History   Problem Relation Age of Onset    Cancer Father         prostate and throat    Heart Disorder Mother     Heart Attack Mother     Other (Parkinson's Dz) Mother 57      reports that she has never smoked. She has never used smokeless tobacco. She reports current alcohol use of about 5.0 standard drinks of alcohol per week. She reports that she does not use drugs.    Allergies:  Allergies   Allergen Reactions    Levaquin [Levofloxacin] PAIN     Tendonitis of the Achilles tendons mild    Adhesive Tape OTHER (SEE COMMENTS)     Welts    Neosporin Original [Neomycin-Bacitracin-Polymyxin] UNKNOWN     Pt reports she was not sure of the  reaction, it was 15 yrs ago.  Tolerates polysporin    Sulfa Antibiotics UNKNOWN     \"Did not feel good.\" Daughters do not believe patient is allergic. Bactrim taken many times according to med history.       Medications:    Current Facility-Administered Medications:     furosemide (Lasix) tab 40 mg, 40 mg, Oral, Daily    lisinopril (Prinivil; Zestril) tab 40 mg, 40 mg, Oral, Daily    spironolactone (Aldactone) partial tab 12.5 mg, 12.5 mg, Oral, Daily    ALPRAZolam (Xanax) tab 0.25 mg, 0.25 mg, Oral, Nightly PRN    magnesium hydroxide (Milk of Magnesia) 400 MG/5ML oral suspension 30 mL, 30 mL, Oral, Daily PRN    enoxaparin (Lovenox) 30 MG/0.3ML SUBQ injection 30 mg, 30 mg, Subcutaneous, Daily    acetaminophen (Tylenol Extra Strength) tab 500 mg, 500 mg, Oral, Q4H PRN    melatonin tab 3 mg, 3 mg, Oral, Nightly PRN    polyethylene glycol (PEG 3350) (Miralax) 17 g oral packet 17 g, 17 g, Oral, Daily PRN    sennosides (Senokot) tab 17.2 mg, 17.2 mg, Oral, Nightly PRN    bisacodyl (Dulcolax) 10 MG rectal suppository 10 mg, 10 mg, Rectal, Daily PRN    Review of Systems:  All systems were reviewed and are negative except as described above in HPI.    Physical Exam:  Blood pressure 119/50, pulse (!) 44, temperature 98.6 °F (37 °C), temperature source Oral, resp. rate 16, height 5' 1\" (1.549 m), weight 136 lb 3.9 oz (61.8 kg), SpO2 97%, not currently breastfeeding.  Temp (24hrs), Av.2 °F (36.8 °C), Min:97.9 °F (36.6 °C), Max:98.6 °F (37 °C)    Wt Readings from Last 3 Encounters:   24 136 lb 3.9 oz (61.8 kg)   22 138 lb 9.6 oz (62.9 kg)   22 138 lb 9.6 oz (62.9 kg)       Telemetry: Second degree avb    Physical Exam  Vitals reviewed.   Constitutional:       General: She is not in acute distress.     Appearance: Normal appearance. She is well-developed and well-groomed. She is not ill-appearing, toxic-appearing or diaphoretic.   Cardiovascular:      Rate and Rhythm: Regular rhythm. Bradycardia present.       Heart sounds: No murmur heard.  Pulmonary:      Effort: Pulmonary effort is normal.      Breath sounds: Normal breath sounds.   Musculoskeletal:      Right lower leg: Edema (trace) present.      Left lower leg: Edema (trace) present.   Skin:     General: Skin is warm and dry.      Capillary Refill: Capillary refill takes less than 2 seconds.   Neurological:      General: No focal deficit present.      Mental Status: She is alert and oriented to person, place, and time.   Psychiatric:         Attention and Perception: Attention normal.         Mood and Affect: Mood normal.         Behavior: Behavior is cooperative.         Laboratories and Data:  Diagnostics:  EK2024  Sinus rhythm with 2nd degree A-V block with 2:1 A-V conduction   Possible Left atrial enlargement   Right bundle branch block   Septal infarct (cited on or before 2022)   Abnormal ECG   When compared with ECG of 10-AUG-2022 13:14,   Sinus rhythm is now with 2nd degree A-V block   Vent. rate has decreased BY  43 BPM     Labs:   Lab Results   Component Value Date    WBC 5.5 2024    RBC 3.21 2024    HGB 10.8 2024    HCT 32.0 2024    MCV 99.7 2024    MCH 33.6 2024    MCHC 33.8 2024    RDW 11.9 2024    .0 2024     Lab Results   Component Value Date    INR 0.96 2024    INR 1.0 2015    INR 1.0 2014       Assessment/Plan:    2nd degree AVB type 2  2:1 conduction   - Monitor on telemetry   Echocardiogram pending   - plan for pacemaker tomorrow, Npo after midnight.   - Discussed with patient and daughters who agree with plan of care.     HTN  - Continue home lisinopril 40 mg daily     LE edema  Suspect CHF component  - well compensated on exam   - echo pending   - Pro BNP normal   - Continue home lasix 40mg daily   - continue home ingrid 25mg daily      Is this a shared or split note between Advanced Practice Provider and Physician? Yes      Genet Farr  APRN  1/24/2024  12:21 PM    I saw and examined the patient and agree with the note above.    MDM:  Bradycardia: EP consulted.  Overnight rate 31 with 2:1 AVB with RBBB.  Given degree of conduction disease, current waking rates in the 30s, she may need a pacer.  Dizziness: Stable, may be ever related.  Edema: On diuretics, stable.  HTN: Follow BP, on antihypertensives  and diuretics    El Watt MD

## 2024-01-24 NOTE — PLAN OF CARE
Received pt from ED at 2120   A/Ox4, forgetful at times w/ hx of dementia  Up w/ assistance  Room air w/ adequate o2 sats. Denies shortness of breath   2nd degree heart block. MD aware. Denies chest pain.   Received pt w/ chronic zhao in place for retention.   Skin assessment completed w/ second TATIANNA Steve.   Safety measures reviewed w/ pt. Oriented to room.   Orders for NPO at [facility]. Cards and EP consulted.   Fall precautions in place. Call light in reach. Pt and family updated on plan of care.     Problem: SAFETY ADULT - FALL  Goal: Free from fall injury  Description: INTERVENTIONS:  - Assess pt frequently for physical needs  - Identify cognitive and physical deficits and behaviors that affect risk of falls.  - Varney fall precautions as indicated by assessment.  - Educate pt/family on patient safety including physical limitations  - Instruct pt to call for assistance with activity based on assessment  - Modify environment to reduce risk of injury  - Provide assistive devices as appropriate  - Consider OT/PT consult to assist with strengthening/mobility  - Encourage toileting schedule  1/23/2024 2309 by Lydia Kaur RN  Outcome: Progressing  1/23/2024 2308 by Lydia Kaur RN  Outcome: Progressing     Problem: Patient/Family Goals  Goal: Patient/Family Long Term Goal  Description: Patient's Long Term Goal: \"go home\"     Interventions:  - medications as ordered by physician  - testing as ordered by physician   - See additional Care Plan goals for specific interventions  1/23/2024 2309 by Lydia Kaur RN  Outcome: Progressing  1/23/2024 2308 by Lydia Kaur RN  Outcome: Progressing  Goal: Patient/Family Short Term Goal  Description: Patient's Short Term Goal: \"maintain adequate HR\"     Interventions:   - medications as ordered by physician   - testing as ordered by physician   - see EP   - see cardiology   - See additional Care Plan goals for specific interventions  1/23/2024 2309 by Lydia Kaur  RN  Outcome: Progressing  1/23/2024 2308 by Lydia Kaur RN  Outcome: Progressing     Problem: CARDIOVASCULAR - ADULT  Goal: Maintains optimal cardiac output and hemodynamic stability  Description: INTERVENTIONS:  - Monitor vital signs, rhythm, and trends  - Monitor for bleeding, hypotension and signs of decreased cardiac output  - Evaluate effectiveness of vasoactive medications to optimize hemodynamic stability  - Monitor arterial and/or venous puncture sites for bleeding and/or hematoma  - Assess quality of pulses, skin color and temperature  - Assess for signs of decreased coronary artery perfusion - ex. Angina  - Evaluate fluid balance, assess for edema, trend weights  1/23/2024 2309 by Lydia Kaur RN  Outcome: Progressing  1/23/2024 2308 by Lydia Karu RN  Outcome: Progressing  Goal: Absence of cardiac arrhythmias or at baseline  Description: INTERVENTIONS:  - Continuous cardiac monitoring, monitor vital signs, obtain 12 lead EKG if indicated  - Evaluate effectiveness of antiarrhythmic and heart rate control medications as ordered  - Initiate emergency measures for life threatening arrhythmias  - Monitor electrolytes and administer replacement therapy as ordered  1/23/2024 2309 by Lydia Kaur RN  Outcome: Progressing  1/23/2024 2308 by Lydia Kaur RN  Outcome: Progressing

## 2024-01-24 NOTE — PLAN OF CARE
Assumed care of patient at 0700. Pt A/Ox 4, forgetful at times. O2 sats maintained on room air. 2nd degree type 2 heart block on tele, HR 36-50's, asymptomatic. Last BM 1/22. Chronic zhao catheter in place. Pt reports no pain. Pt up x1 assist and walker. Pt updated on plan of care. Care needs met. Bed in lowest position, Call light within reach. Bed alarm on.     POC: NPO, echo, cards/EP consult, PT eval     Problem: SAFETY ADULT - FALL  Goal: Free from fall injury  Description: INTERVENTIONS:  - Assess pt frequently for physical needs  - Identify cognitive and physical deficits and behaviors that affect risk of falls.  - [place] fall precautions as indicated by assessment.  - Educate pt/family on patient safety including physical limitations  - Instruct pt to call for assistance with activity based on assessment  - Modify environment to reduce risk of injury  - Provide assistive devices as appropriate  - Consider OT/PT consult to assist with strengthening/mobility  - Encourage toileting schedule  Outcome: Progressing     Problem: Patient/Family Goals  Goal: Patient/Family Long Term Goal  Description: Patient's Long Term Goal: \"go home\"     Interventions:  - medications as ordered by physician  - testing as ordered by physician   - See additional Care Plan goals for specific interventions  Outcome: Progressing  Goal: Patient/Family Short Term Goal  Description: Patient's Short Term Goal: \"maintain adequate HR\"     Interventions:   - medications as ordered by physician   - testing as ordered by physician   - see EP   - see cardiology   - See additional Care Plan goals for specific interventions  Outcome: Progressing     Problem: CARDIOVASCULAR - ADULT  Goal: Maintains optimal cardiac output and hemodynamic stability  Description: INTERVENTIONS:  - Monitor vital signs, rhythm, and trends  - Monitor for bleeding, hypotension and signs of decreased cardiac output  - Evaluate effectiveness of vasoactive medications to  optimize hemodynamic stability  - Monitor arterial and/or venous puncture sites for bleeding and/or hematoma  - Assess quality of pulses, skin color and temperature  - Assess for signs of decreased coronary artery perfusion - ex. Angina  - Evaluate fluid balance, assess for edema, trend weights  Outcome: Progressing  Goal: Absence of cardiac arrhythmias or at baseline  Description: INTERVENTIONS:  - Continuous cardiac monitoring, monitor vital signs, obtain 12 lead EKG if indicated  - Evaluate effectiveness of antiarrhythmic and heart rate control medications as ordered  - Initiate emergency measures for life threatening arrhythmias  - Monitor electrolytes and administer replacement therapy as ordered  Outcome: Progressing

## 2024-01-24 NOTE — H&P
[facility] H&P/Consult note       CC:   Chief Complaint   Patient presents with    Arrythmia/Palpitations        PCP: Brendan Love MD    History of Present Illness: Patient is a 83 year old female with PMH sig for htn, chf, dementia per family, here for low HR  She resides in NH and was noted to be bradycardic, EKG showed AVB.   She deneida ayn dizziness / syncope, chest pain but states ahas been more fatiuged recently  Family at [facility]  Past Medical History:   Diagnosis Date    Abdominal hernia     Abdominal pain     Anemia     Anxiety     Arthritis     Atypical mole     Back pain     Bad breath     Bloating     Blurred vision     Constipation     Depressed state     never required treatment    Diarrhea, unspecified     Easy bruising     Enlarged lymph node     Flatulence/gas pain/belching     Frequent use of laxatives     GERD (gastroesophageal reflux disease)     Dr. Valencia EGS adn colosncopy 2010    Hearing loss     Hemorrhoids     History of cardiac murmur     Hoarseness, chronic     per patient from Allergies    Indigestion     Irregular bowel habits     Migraine headache     Nausea     Night sweats     Osteoporosis     Pain in joints     Pain with bowel movements     Painful urination     Presence of other cardiac implants and grafts     2 hip replacements    Stress     Uncomfortable fullness after meals     Urinary retention     Weight loss         PSH  Past Surgical History:   Procedure Laterality Date    COLONOSCOPY N/A 4/8/2021    Procedure: colonoscopy;  Surgeon: Reagan Watt MD;  Location:  ENDOSCOPY    DRAIN/INJECT LARGE JOINT/BURSA  8/22/2013    Procedure: HIP INJECTION (PAIN);  Surgeon: Julián Suarez MD;  Location: [facility]    FLUOROSCOPIC GUIDANCE NEEDLE PLACEMENT  8/22/2013    Procedure: HIP INJECTION (PAIN);  Surgeon: Julián Suarez MD;  Location: [facility]    HIP REPLACEMENT SURGERY      bilateral   Dr turner  right 2015  left  2016     OTHER      R THR    OTHER SURGICAL HISTORY      hernia 1968    OTHER SURGICAL HISTORY      breast biopsy 1980    OTHER SURGICAL HISTORY  04/25/2014    flow [facility]- Dr. Ron    OTHER SURGICAL HISTORY  07/01/2019    cysto dr moreno    OTHER SURGICAL HISTORY  01/12/2022    cystoscopy-     PATIENT DOCUMENTED NOT TO HAVE EXPERIENCED ANY OF THE FOLLOWING EVENTS  8/22/2013    Procedure: HIP INJECTION (PAIN);  Surgeon: Julián Suarez MD;  Location: [facility]    PATIENT WITHOUGH PREOPERATIVE ORDER FOR IV ANTIBIOTIC SURGICAL SITE INFECTION PROPHYLAXIS.  8/22/2013    Procedure: HIP INJECTION (PAIN);  Surgeon: Julián Suarez MD;  Location: [facility]    TONSILLECTOMY          ALL:  Allergies   Allergen Reactions    Levaquin [Levofloxacin] PAIN     Tendonitis of the Achilles tendons mild    Adhesive Tape OTHER (SEE COMMENTS)     Welts    Neosporin Original [Neomycin-Bacitracin-Polymyxin] UNKNOWN     Pt reports she was not sure of the reaction, it was 15 yrs ago.  Tolerates polysporin    Sulfa Antibiotics UNKNOWN     \"Did not feel good.\" Daughters do not believe patient is allergic. Bactrim taken many times according to med history.        Home Medications:  Outpatient Medications Marked as Taking for the 1/23/24 encounter (Hospital Encounter)   Medication Sig Dispense Refill    furosemide 40 MG Oral Tab Take 1 tablet (40 mg total) by mouth daily.      methenamine 1 g Oral Tab Take 1 tablet (1 g total) by mouth 2 (two) times daily.      spironolactone 25 MG Oral Tab Take 0.5 tablets (12.5 mg total) by mouth daily.      calcium carbonate 500 MG Oral Chew Tab Chew 1 tablet (500 mg total) by mouth daily.      ibuprofen 200 MG Oral Tab Take 1 tablet (200 mg total) by mouth every 6 (six) hours as needed for Pain.      magnesium hydroxide (MILK OF MAGNESIA) 400 MG/5ML Oral Suspension Take 30 mL by mouth daily as needed for constipation.      ALPRAZolam 0.25 MG Oral Tab Take 1  tablet (0.25 mg total) by mouth nightly as needed for Anxiety.           Soc Hx  Social History     Tobacco Use    Smoking status: Never    Smokeless tobacco: Never   Substance Use Topics    Alcohol use: Yes     Alcohol/week: 5.0 standard drinks of alcohol     Types: 3 Glasses of wine, 2 Shots of liquor per week        Fam Hx  Family History   Problem Relation Age of Onset    Cancer Father         prostate and throat    Heart Disorder Mother     Heart Attack Mother     Other (Parkinson's Dz) Mother 57       Review of Systems  Comprehensive ROS reviewed and negative except for what's stated above.         OBJECTIVE:  BP (!) 178/46   Pulse (!) 38   Temp 98.5 °F (36.9 °C) (Temporal)   Resp 16   Ht 5' 1\" (1.549 m)   Wt 147 lb (66.7 kg)   SpO2 96%   BMI 27.78 kg/m²   General:  Alert, no distress, appears stated age.   Head:  Normocephalic, without obvious abnormality, atraumatic.   Eyes:  Sclera anicteric, No conjunctival pallor, EOMs intact.    Throat: dry   Neck: Supple,    Lungs:   Clear to auscultation bilaterally. Normal effort   Chest wall:  No tenderness or deformity.   Heart:  bradycardic   Abdomen:   Soft, NT/ND    Extremities: Atraumatic trace edema   Skin: No visible rashes or lesions.    Neurologic: Normal strength, no focal deficit appreciated     Diagnostic Data:    CBC/Chem  Recent Labs   Lab 01/23/24  1539   WBC 6.7   HGB 12.2   MCV 99.5   .0   INR 0.96       Recent Labs   Lab 01/23/24  1539      K 3.7      CO2 30.0   BUN 30*   CREATSERUM 1.12*   GLU 93   CA 9.2   MG 2.3       Recent Labs   Lab 01/23/24  1539   AST 18   ALB 3.8       No results for input(s): \"TROP\" in the last 168 hours.    Additional Diagnostics: ECG: bradycardic    CXR: image personally reviewed     Radiology: XR CHEST AP PORTABLE  (CPT=71045)    Result Date: 1/23/2024  PROCEDURE:  XR CHEST AP PORTABLE  (CPT=71045)  TECHNIQUE:  AP chest radiograph was obtained.  COMPARISON:  08/10/2022  INDICATIONS:  2nd degree  Heart Block-from [facility]  PATIENT STATED HISTORY: (As transcribed by Technologist)  Pt. with 2nd degree Heart Block-from [facility]                 CONCLUSION:   Stable cardiac and mediastinal contours.  Stable prominence of the pulmonary vasculature.  Mild reticular scarring atelectasis of the right lung base without pulmonary edema or acute airspace disease.  The pleural spaces are clear.   LOCATION:  [facility]      Dictated by (CST): Hugo Lopez MD on 1/23/2024 at 4:34 PM     Finalized by (CST): Hugo Lopez MD on 1/23/2024 at 4:35 PM          ASSESSMENT / PLAN:     Patient is a 83 year old female with PMH sig for htn, chf, dementia per family, here for low HR    Impression    -bradycardia / 2nd degree AVB  -chronic CHF   -HTN  -dementia    Plan    *CV  -current HR in low 40s, pt asymptomatic  Tsh ok   Echo pending  EP eval   Tele    *chronic CHF  -cont laisx / aldactone per cards  Does not appear grossly overloaded    *HTN  -on lisinopril, conitnued    Scds        Further recommendations pending patient's clinical course. [facility] hospitalist to continue to follow patient while in house    Patient and/or patient's family given opportunity to ask questions and note understanding and agreeing with therapeutic plan as outlined    [facility] Hospitalist  [phone]  Answering Service: [phone]

## 2024-01-24 NOTE — PROGRESS NOTES
01/23/24 2126 01/23/24 2128 01/23/24 2130   Vital Signs   Pulse (!) 40 (!) 43 50   Heart Rate Source Monitor Monitor Monitor   Resp 16  --   --    Respiratory Quality Normal  --   --    BP (!) 174/84 (!) 180/48 (!) 184/43   MAP (mmHg) 72 82 81   BP Location Left arm Left arm Left arm   BP Method Automatic Automatic Automatic   Patient Position Lying Sitting Standing     Pt denied dizziness or lightheadedness at time of admission.

## 2024-01-24 NOTE — PHYSICAL THERAPY NOTE
Order received for PT eval and chart reviewed. Pt presenting with bradycardia. Plan for pacemaker placement 1/25/24. PT will follow up post op.

## 2024-01-25 ENCOUNTER — APPOINTMENT (OUTPATIENT)
Dept: INTERVENTIONAL RADIOLOGY/VASCULAR | Facility: HOSPITAL | Age: 84
End: 2024-01-25
Attending: INTERNAL MEDICINE
Payer: MEDICARE

## 2024-01-25 ENCOUNTER — APPOINTMENT (OUTPATIENT)
Dept: GENERAL RADIOLOGY | Facility: HOSPITAL | Age: 84
End: 2024-01-25
Attending: INTERNAL MEDICINE
Payer: MEDICARE

## 2024-01-25 LAB
ANION GAP SERPL CALC-SCNC: 3 MMOL/L (ref 0–18)
BASOPHILS # BLD AUTO: 0.04 X10(3) UL (ref 0–0.2)
BASOPHILS NFR BLD AUTO: 0.7 %
BUN BLD-MCNC: 27 MG/DL (ref 9–23)
CALCIUM BLD-MCNC: 9.4 MG/DL (ref 8.5–10.1)
CHLORIDE SERPL-SCNC: 111 MMOL/L (ref 98–112)
CO2 SERPL-SCNC: 29 MMOL/L (ref 21–32)
CREAT BLD-MCNC: 0.75 MG/DL
EGFRCR SERPLBLD CKD-EPI 2021: 79 ML/MIN/1.73M2 (ref 60–?)
EOSINOPHIL # BLD AUTO: 0.17 X10(3) UL (ref 0–0.7)
EOSINOPHIL NFR BLD AUTO: 3 %
ERYTHROCYTE [DISTWIDTH] IN BLOOD BY AUTOMATED COUNT: 11.9 %
GLUCOSE BLD-MCNC: 95 MG/DL (ref 70–99)
HCT VFR BLD AUTO: 34.5 %
HGB BLD-MCNC: 11.5 G/DL
IMM GRANULOCYTES # BLD AUTO: 0.02 X10(3) UL (ref 0–1)
IMM GRANULOCYTES NFR BLD: 0.4 %
LYMPHOCYTES # BLD AUTO: 1.58 X10(3) UL (ref 1–4)
LYMPHOCYTES NFR BLD AUTO: 28.2 %
MAGNESIUM SERPL-MCNC: 2.4 MG/DL (ref 1.6–2.6)
MCH RBC QN AUTO: 33.3 PG (ref 26–34)
MCHC RBC AUTO-ENTMCNC: 33.3 G/DL (ref 31–37)
MCV RBC AUTO: 100 FL
MONOCYTES # BLD AUTO: 0.61 X10(3) UL (ref 0.1–1)
MONOCYTES NFR BLD AUTO: 10.9 %
NEUTROPHILS # BLD AUTO: 3.18 X10 (3) UL (ref 1.5–7.7)
NEUTROPHILS # BLD AUTO: 3.18 X10(3) UL (ref 1.5–7.7)
NEUTROPHILS NFR BLD AUTO: 56.8 %
OSMOLALITY SERPL CALC.SUM OF ELEC: 301 MOSM/KG (ref 275–295)
PLATELET # BLD AUTO: 211 10(3)UL (ref 150–450)
POTASSIUM SERPL-SCNC: 4 MMOL/L (ref 3.5–5.1)
RBC # BLD AUTO: 3.45 X10(6)UL
SODIUM SERPL-SCNC: 143 MMOL/L (ref 136–145)
WBC # BLD AUTO: 5.6 X10(3) UL (ref 4–11)

## 2024-01-25 PROCEDURE — 71045 X-RAY EXAM CHEST 1 VIEW: CPT | Performed by: INTERNAL MEDICINE

## 2024-01-25 PROCEDURE — 85025 COMPLETE CBC W/AUTO DIFF WBC: CPT | Performed by: HOSPITALIST

## 2024-01-25 PROCEDURE — 02HK3JZ INSERTION OF PACEMAKER LEAD INTO RIGHT VENTRICLE, PERCUTANEOUS APPROACH: ICD-10-PCS | Performed by: INTERNAL MEDICINE

## 2024-01-25 PROCEDURE — 99152 MOD SED SAME PHYS/QHP 5/>YRS: CPT | Performed by: INTERNAL MEDICINE

## 2024-01-25 PROCEDURE — 99153 MOD SED SAME PHYS/QHP EA: CPT | Performed by: INTERNAL MEDICINE

## 2024-01-25 PROCEDURE — 02H63JZ INSERTION OF PACEMAKER LEAD INTO RIGHT ATRIUM, PERCUTANEOUS APPROACH: ICD-10-PCS | Performed by: INTERNAL MEDICINE

## 2024-01-25 PROCEDURE — 80048 BASIC METABOLIC PNL TOTAL CA: CPT | Performed by: HOSPITALIST

## 2024-01-25 PROCEDURE — 0JH606Z INSERTION OF PACEMAKER, DUAL CHAMBER INTO CHEST SUBCUTANEOUS TISSUE AND FASCIA, OPEN APPROACH: ICD-10-PCS | Performed by: INTERNAL MEDICINE

## 2024-01-25 PROCEDURE — 33208 INSRT HEART PM ATRIAL & VENT: CPT | Performed by: INTERNAL MEDICINE

## 2024-01-25 PROCEDURE — 83735 ASSAY OF MAGNESIUM: CPT | Performed by: HOSPITALIST

## 2024-01-25 RX ORDER — VANCOMYCIN HYDROCHLORIDE 1 G/20ML
INJECTION, POWDER, LYOPHILIZED, FOR SOLUTION INTRAVENOUS
Status: COMPLETED
Start: 2024-01-25 | End: 2024-01-25

## 2024-01-25 RX ORDER — CEFAZOLIN SODIUM/WATER 2 G/20 ML
2 SYRINGE (ML) INTRAVENOUS EVERY 8 HOURS
Qty: 40 ML | Refills: 0 | Status: COMPLETED | OUTPATIENT
Start: 2024-01-26 | End: 2024-01-26

## 2024-01-25 RX ORDER — LIDOCAINE HYDROCHLORIDE 10 MG/ML
INJECTION, SOLUTION EPIDURAL; INFILTRATION; INTRACAUDAL; PERINEURAL
Status: COMPLETED
Start: 2024-01-25 | End: 2024-01-25

## 2024-01-25 RX ORDER — MIDAZOLAM HYDROCHLORIDE 1 MG/ML
INJECTION INTRAMUSCULAR; INTRAVENOUS
Status: COMPLETED
Start: 2024-01-25 | End: 2024-01-25

## 2024-01-25 RX ORDER — DIPHENHYDRAMINE HYDROCHLORIDE 50 MG/ML
INJECTION INTRAMUSCULAR; INTRAVENOUS
Status: COMPLETED
Start: 2024-01-25 | End: 2024-01-25

## 2024-01-25 RX ORDER — ENOXAPARIN SODIUM 100 MG/ML
40 INJECTION SUBCUTANEOUS DAILY
Status: DISCONTINUED | OUTPATIENT
Start: 2024-01-26 | End: 2024-01-26

## 2024-01-25 RX ORDER — ONDANSETRON 2 MG/ML
4 INJECTION INTRAMUSCULAR; INTRAVENOUS EVERY 6 HOURS PRN
Status: DISCONTINUED | OUTPATIENT
Start: 2024-01-25 | End: 2024-01-26

## 2024-01-25 RX ORDER — CEFAZOLIN SODIUM/WATER 2 G/20 ML
SYRINGE (ML) INTRAVENOUS
Status: COMPLETED
Start: 2024-01-25 | End: 2024-01-25

## 2024-01-25 NOTE — DISCHARGE INSTRUCTIONS
Resume home health with [facility] at discharge.     [place] [id number] [place] [address]  Phone: [phone]  Fax: [phone]

## 2024-01-25 NOTE — PROGRESS NOTES
[facility] Hospitalist Progress Note                                                                     [place]      Evelyn Reeves  2/25/1940    SUBJECTIVE: no chest pain, palpitations, shortness of breath, cough, nausea, vomiting, abdominal pain. No dizziness or weakness.     OBJECTIVE:  Temp:  [98 °F (36.7 °C)-98.4 °F (36.9 °C)] 98.3 °F (36.8 °C)  Pulse:  [38-70] 70  Resp:  [16-20] 17  BP: (103-133)/(33-95) 103/45  SpO2:  [95 %-96 %] 95 %  Exam  Gen: No acute distress, alert and oriented x3  Pulm: Lungs clear bilaterally, normal respiratory effort, no crackles, no wheezing  CV: Bradycardic, no murmur.  Abd: Abdomen soft, nontender, nondistended, bowel sounds present  MSK: No significant pitting edema or tenderness of the LE  Skin: no rashes or lesions    Labs:   Recent Labs   Lab 01/23/24  1539 01/24/24  0542 01/25/24  0514   WBC 6.7 5.5 5.6   HGB 12.2 10.8* 11.5*   MCV 99.5 99.7 100.0   .0 213.0 211.0   INR 0.96  --   --        Recent Labs   Lab 01/23/24  1539 01/24/24  0542 01/25/24  0514    143 143   K 3.7 4.4 4.0    114* 111   CO2 30.0 28.0 29.0   BUN 30* 28* 27*   CREATSERUM 1.12* 0.91 0.75   CA 9.2 8.8 9.4   MG 2.3  --  2.4   GLU 93 95 95       Recent Labs   Lab 01/23/24  1539   AST 18   ALB 3.8       No results for input(s): \"PGLU\" in the last 168 hours.    Meds:   Scheduled:    furosemide  40 mg Oral Daily    lisinopril  40 mg Oral Daily    spironolactone  12.5 mg Oral Daily    enoxaparin  30 mg Subcutaneous Daily     Continuous Infusions:   PRN: glycerin-hypromellose-, ALPRAZolam, magnesium hydroxide, acetaminophen, melatonin, polyethylene glycol (PEG 3350), sennosides, bisacodyl    ASSESSMENT / PLAN:      83 year old female with PMH sig for htn, chf, dementia per family, here for low HR     Impression     -bradycardia / 2nd degree AVB  -chronic CHF   -HTN  -dementia     Plan     *CV  -current HR in low 40s, pt  asymptomatic  -Tsh ok   -Echo pending  -EP eval / cards eval pending --> PM today   -Tele     *chronic CHF  -cont laisx / aldactone per cards  -Does not appear grossly overloaded     *HTN  -on lisinopril, conitnued     Scds  Lovenox subcutaneous    Dispo: no discharge    Kapil Ray MD  [facility]  [phone]

## 2024-01-25 NOTE — PLAN OF CARE
Assumed patient care around 0730 this AM. Patient A&O x3, semi-oriented to situation. SPO2 maintained on RA, no c/o SOB. No BLE edema, receiving PO diuretics. Heart block on tele- changing between ever 1st degree AVB & 2nd degree type 2 AVB. Plan for PPM insertion today per report. Lovenox subcutaneous for dvt prophylaxis- held pre-procedure this AM. Spiritual care consulted for pre-procedure prayer per pt/daughter request. Chronic zhao in place, continent of bowel, BM 1/24. Denies pain at this time. Up with assist and walker. Updated on POC, needs met.     Per performing MD pt must be agreeable to suspending DNAR status during time of procedure to proceed. Dr. Cantor at bedside to discuss with pt/daughter. Pt and daughter/POA Earlene agreeable. New updated consent form signed.     1828: Pt returned s/p PPM insertion. Dressing to left upper chest CDI, site soft/no hematoma. Strong radial pulse. Site assessment and vitals per protocol. Pt instructed not to lift her arm above her shoulder, sling unavailable at this time per cath lab RN.     Problem: CARDIOVASCULAR - ADULT  Goal: Maintains optimal cardiac output and hemodynamic stability  Description: INTERVENTIONS:  - Monitor vital signs, rhythm, and trends  - Monitor for bleeding, hypotension and signs of decreased cardiac output  - Evaluate effectiveness of vasoactive medications to optimize hemodynamic stability  - Monitor arterial and/or venous puncture sites for bleeding and/or hematoma  - Assess quality of pulses, skin color and temperature  - Assess for signs of decreased coronary artery perfusion - ex. Angina  - Evaluate fluid balance, assess for edema, trend weights  Outcome: Progressing  Goal: Absence of cardiac arrhythmias or at baseline  Description: INTERVENTIONS:  - Continuous cardiac monitoring, monitor vital signs, obtain 12 lead EKG if indicated  - Evaluate effectiveness of antiarrhythmic and heart rate control medications as ordered  - Initiate  emergency measures for life threatening arrhythmias  - Monitor electrolytes and administer replacement therapy as ordered  Outcome: Progressing     Problem: SAFETY ADULT - FALL  Goal: Free from fall injury  Description: INTERVENTIONS:  - Assess pt frequently for physical needs  - Identify cognitive and physical deficits and behaviors that affect risk of falls.  - [place] fall precautions as indicated by assessment.  - Educate pt/family on patient safety including physical limitations  - Instruct pt to call for assistance with activity based on assessment  - Modify environment to reduce risk of injury  - Provide assistive devices as appropriate  - Consider OT/PT consult to assist with strengthening/mobility  - Encourage toileting schedule  Outcome: Progressing

## 2024-01-25 NOTE — CM/SW NOTE
01/25/24 1000   CM/SW Referral Data   Referral Source Social Work (self-referral)   Reason for Referral Discharge planning   Informant EMR;Clinical Staff Member   Patient Info   Patient's Current Mental Status at Time of Assessment Alert;Oriented   Patient's Home Environment Assisted Living   Post Acute Care Provider Upon Admission   ([facility])   Patient Status Prior to Admission   Independent with ADLs and Mobility Yes   Services in place prior to admission Home Health Care;DME/Supplies at home   Home Health Provider Info [facility]   Type of DME/Supplies Wheeled Walker   Discharge Needs   Anticipated D/C needs Home health care       Chart reviewed for dc planning. Pt admitted from [facility]. Spoke with Abby, , at the facility: [phone]. Discussed PLOF prior to admitting to hospital. Pt uses her RW, sometimes needs assistance with pulling up pants, but likes to wear a lot of dresses. Pt has a chronic zhao that is managed by [facility]. Pt has [facility].  [facility] Pt is able to return to [facility] at discharge. RN to call report at discharge. Will need to verify how to send rx's at discharge. Pt is going for PPM placement today - anticipate weekend discharge?    Report # [phone], find out about rx's if pt goes on any new medications.     &  to remain available and supportive for discharge planning needs.    Evi Hernandez, MSW, LSW  Discharge Planner  [phone]

## 2024-01-25 NOTE — PROGRESS NOTES
01/25/24 1036   Clinical Encounter Type   Visited With Patient   Routine Visit Introduction   Continue Visiting No   Surgical Visit Pre-op   Referral To    Anabaptist Encounters   Anabaptist Needs Prayer   Patient Spiritual Encounters   Spiritual Assessment Completed Yes   Taxonomy   Intended Effects Build relationship of care and support   Methods Collaborate with care team member;Encourage self reflection;Offer spiritual/Caodaism support   Interventions [place]     The  responded to the on call phone to provide support for a patient undergoing a procedure who requested prayer.  The  established a supportive relationship and encouraged the patient to engage in self-reflection.  During our conversation the patient mentioned having five children, three of whom reside in [place].  Additionally, the patient shared memories of her late , who passed away several years ago.  The  offered a prayer at the patient's bedside, and the patient expressed gratitude for the  visit. Spiritual Care support can be requested via an Epic consult.      Joanne Back

## 2024-01-25 NOTE — PLAN OF CARE
Pt having PPM insertion tomorrow 1/25/24. Consent presented and explained that DNR status is suspended during procedure unless explicitly stated. Pt and POA discussed code status and explicitly stated she wants to remain DNR/select during procedure and recovery. [facility] NP paged. Dr. Cantor paged who called back and said he notified Dr. Watt. Consent updated to reflect patient's wishes.

## 2024-01-25 NOTE — PLAN OF CARE
Assumed care of pt around 1930  DNAR/select  AxO x4 -forgetful at times/ hx dementia, up x1 and walker  2nd degree type 2 on tele, occasionally in SB, no cardiac symptoms  R/A, up x1 and walker  Npo as of 0000 for procedure  Chronic zhao in place, continent of bowel  Fall precautions in place  Pt updated on plan of care, all needs met at this time          Problem: SAFETY ADULT - FALL  Goal: Free from fall injury  Description: INTERVENTIONS:  - Assess pt frequently for physical needs  - Identify cognitive and physical deficits and behaviors that affect risk of falls.  - [place] fall precautions as indicated by assessment.  - Educate pt/family on patient safety including physical limitations  - Instruct pt to call for assistance with activity based on assessment  - Modify environment to reduce risk of injury  - Provide assistive devices as appropriate  - Consider OT/PT consult to assist with strengthening/mobility  - Encourage toileting schedule  Outcome: Progressing     Problem: Patient/Family Goals  Goal: Patient/Family Long Term Goal  Description: Patient's Long Term Goal: \"go home\"     Interventions:  - medications as ordered by physician  - testing as ordered by physician   - See additional Care Plan goals for specific interventions  Outcome: Progressing  Goal: Patient/Family Short Term Goal  Description: Patient's Short Term Goal: \"maintain adequate HR\"     Interventions:   - medications as ordered by physician   - testing as ordered by physician   - see EP   - see cardiology   - See additional Care Plan goals for specific interventions  Outcome: Progressing     Problem: CARDIOVASCULAR - ADULT  Goal: Maintains optimal cardiac output and hemodynamic stability  Description: INTERVENTIONS:  - Monitor vital signs, rhythm, and trends  - Monitor for bleeding, hypotension and signs of decreased cardiac output  - Evaluate effectiveness of vasoactive medications to optimize hemodynamic stability  - Monitor arterial  and/or venous puncture sites for bleeding and/or hematoma  - Assess quality of pulses, skin color and temperature  - Assess for signs of decreased coronary artery perfusion - ex. Angina  - Evaluate fluid balance, assess for edema, trend weights  Outcome: Progressing  Goal: Absence of cardiac arrhythmias or at baseline  Description: INTERVENTIONS:  - Continuous cardiac monitoring, monitor vital signs, obtain 12 lead EKG if indicated  - Evaluate effectiveness of antiarrhythmic and heart rate control medications as ordered  - Initiate emergency measures for life threatening arrhythmias  - Monitor electrolytes and administer replacement therapy as ordered  Outcome: Progressing

## 2024-01-26 ENCOUNTER — APPOINTMENT (OUTPATIENT)
Dept: GENERAL RADIOLOGY | Facility: HOSPITAL | Age: 84
End: 2024-01-26
Attending: INTERNAL MEDICINE
Payer: MEDICARE

## 2024-01-26 VITALS
WEIGHT: 134.94 LBS | RESPIRATION RATE: 18 BRPM | BODY MASS INDEX: 25.48 KG/M2 | DIASTOLIC BLOOD PRESSURE: 52 MMHG | TEMPERATURE: 98 F | HEIGHT: 61 IN | SYSTOLIC BLOOD PRESSURE: 126 MMHG | OXYGEN SATURATION: 98 % | HEART RATE: 72 BPM

## 2024-01-26 LAB
ANION GAP SERPL CALC-SCNC: 3 MMOL/L (ref 0–18)
BASOPHILS # BLD AUTO: 0.06 X10(3) UL (ref 0–0.2)
BASOPHILS NFR BLD AUTO: 0.7 %
BUN BLD-MCNC: 32 MG/DL (ref 9–23)
CALCIUM BLD-MCNC: 9.6 MG/DL (ref 8.5–10.1)
CHLORIDE SERPL-SCNC: 109 MMOL/L (ref 98–112)
CO2 SERPL-SCNC: 28 MMOL/L (ref 21–32)
CREAT BLD-MCNC: 0.88 MG/DL
EGFRCR SERPLBLD CKD-EPI 2021: 66 ML/MIN/1.73M2 (ref 60–?)
EOSINOPHIL # BLD AUTO: 0.13 X10(3) UL (ref 0–0.7)
EOSINOPHIL NFR BLD AUTO: 1.6 %
ERYTHROCYTE [DISTWIDTH] IN BLOOD BY AUTOMATED COUNT: 11.7 %
GLUCOSE BLD-MCNC: 106 MG/DL (ref 70–99)
HCT VFR BLD AUTO: 35.3 %
HGB BLD-MCNC: 12 G/DL
IMM GRANULOCYTES # BLD AUTO: 0.04 X10(3) UL (ref 0–1)
IMM GRANULOCYTES NFR BLD: 0.5 %
LYMPHOCYTES # BLD AUTO: 1.3 X10(3) UL (ref 1–4)
LYMPHOCYTES NFR BLD AUTO: 15.9 %
MAGNESIUM SERPL-MCNC: 2.4 MG/DL (ref 1.6–2.6)
MCH RBC QN AUTO: 33.4 PG (ref 26–34)
MCHC RBC AUTO-ENTMCNC: 34 G/DL (ref 31–37)
MCV RBC AUTO: 98.3 FL
MONOCYTES # BLD AUTO: 0.77 X10(3) UL (ref 0.1–1)
MONOCYTES NFR BLD AUTO: 9.4 %
NEUTROPHILS # BLD AUTO: 5.9 X10 (3) UL (ref 1.5–7.7)
NEUTROPHILS # BLD AUTO: 5.9 X10(3) UL (ref 1.5–7.7)
NEUTROPHILS NFR BLD AUTO: 71.9 %
OSMOLALITY SERPL CALC.SUM OF ELEC: 297 MOSM/KG (ref 275–295)
PLATELET # BLD AUTO: 214 10(3)UL (ref 150–450)
POTASSIUM SERPL-SCNC: 3.8 MMOL/L (ref 3.5–5.1)
RBC # BLD AUTO: 3.59 X10(6)UL
SODIUM SERPL-SCNC: 140 MMOL/L (ref 136–145)
WBC # BLD AUTO: 8.2 X10(3) UL (ref 4–11)

## 2024-01-26 PROCEDURE — 80048 BASIC METABOLIC PNL TOTAL CA: CPT | Performed by: HOSPITALIST

## 2024-01-26 PROCEDURE — 85025 COMPLETE CBC W/AUTO DIFF WBC: CPT | Performed by: HOSPITALIST

## 2024-01-26 PROCEDURE — 97161 PT EVAL LOW COMPLEX 20 MIN: CPT

## 2024-01-26 PROCEDURE — 83735 ASSAY OF MAGNESIUM: CPT | Performed by: HOSPITALIST

## 2024-01-26 PROCEDURE — 71046 X-RAY EXAM CHEST 2 VIEWS: CPT | Performed by: INTERNAL MEDICINE

## 2024-01-26 PROCEDURE — 97116 GAIT TRAINING THERAPY: CPT

## 2024-01-26 NOTE — PROGRESS NOTES
[facility] Hospitalist Progress Note                                                                     [facility]      Evelyn Reeves  2/25/1940    SUBJECTIVE: no chest pain, palpitations, shortness of breath, cough, nausea, vomiting, abdominal pain. No dizziness or weakness.     OBJECTIVE:  Temp:  [98 °F (36.7 °C)-98.3 °F (36.8 °C)] 98 °F (36.7 °C)  Pulse:  [] 75  Resp:  [15-18] 18  BP: ()/(30-74) 157/56  SpO2:  [94 %-95 %] 94 %  Exam  Gen: No acute distress, alert and oriented x3  Pulm: Lungs clear bilaterally, normal respiratory effort, no crackles, no wheezing  CV: Bradycardic, no murmur.  Abd: Abdomen soft, nontender, nondistended, bowel sounds present  MSK: No significant pitting edema or tenderness of the LE  Skin: no rashes or lesions    Labs:   Recent Labs   Lab 01/23/24  1539 01/24/24  0542 01/25/24  0514 01/26/24  0453   WBC 6.7 5.5 5.6 8.2   HGB 12.2 10.8* 11.5* 12.0   MCV 99.5 99.7 100.0 98.3   .0 213.0 211.0 214.0   INR 0.96  --   --   --        Recent Labs   Lab 01/23/24  1539 01/24/24  0542 01/25/24  0514 01/26/24  0453    143 143 140   K 3.7 4.4 4.0 3.8    114* 111 109   CO2 30.0 28.0 29.0 28.0   BUN 30* 28* 27* 32*   CREATSERUM 1.12* 0.91 0.75 0.88   CA 9.2 8.8 9.4 9.6   MG 2.3  --  2.4 2.4   GLU 93 95 95 106*       Recent Labs   Lab 01/23/24  1539   AST 18   ALB 3.8       No results for input(s): \"PGLU\" in the last 168 hours.    Meds:   Scheduled:    ceFAZolin  2 g Intravenous Q8H    enoxaparin  40 mg Subcutaneous Daily    furosemide  40 mg Oral Daily    lisinopril  40 mg Oral Daily    spironolactone  12.5 mg Oral Daily     Continuous Infusions:   PRN: glycerin-hypromellose-, ondansetron, ALPRAZolam, magnesium hydroxide, acetaminophen, melatonin, polyethylene glycol (PEG 3350), sennosides, bisacodyl    ASSESSMENT / PLAN:      83 year old female with PMH sig for htn, chf, dementia per family, here for low  HR     Impression     -bradycardia / 2nd degree AVB  -chronic CHF   -HTN  -dementia     Plan     *CV  -current HR in low 40s, pt asymptomatic  -Tsh ok   -Echo pending--> no acute pathology   -EP eval / cards eval pending --> POD # 1 s/p PM placement  -Tele     *chronic CHF  -cont laisx / aldactone per cards  -Does not appear grossly overloaded     *HTN  -on lisinopril, conitnued     Scds  Lovenox subcutaneous    Dispo: discharge per cardiology     Kapil Ray MD  [facility] Hospitalist  [phone]

## 2024-01-26 NOTE — PHYSICAL THERAPY NOTE
PHYSICAL THERAPY EVALUATION - INPATIENT     Room Number: 8611/8611-A  Evaluation Date: 1/26/2024  Type of Evaluation: Initial  Physician Order: PT Eval and Treat    Presenting Problem: bradycardia, s/p ppm 1/25  Co-Morbidities : HTN, CHF, dementia  Reason for Therapy: Mobility Dysfunction and Discharge Planning    History related to current admission: Patient is a 83 year old female admitted on 1/23/2024 from home for bradycardia.  Pt s/p ppm placement 1/25/24.     ASSESSMENT   In this PT evaluation, the patient presents with the following impairments limited endurance, L UE pain, balance impairments, and decreased activity tolerance.  These impairments and comorbidities manifest themselves as functional limitations in independent bed mobility, transfers, and gait.  The patient is below baseline and would benefit from skilled inpatient PT to address the above deficits to assist patient in returning to prior to level of function.   Functional outcome measures completed include AMPAC.  The AM-PAC '6-Clicks' Inpatient Basic Mobility Short Form was completed and this patient is demonstrating a Approx Degree of Impairment: 46.58%  degree of impairment in mobility. Research supports that patients with this level of impairment may benefit from HHPT.  DISCHARGE RECOMMENDATIONS  PT Discharge Recommendations: Home with home health PT    PLAN  PT Treatment Plan: Bed mobility;Endurance;Energy conservation;Patient education;Balance training;Stair training;Strengthening;Gait training  Rehab Potential : Good  Frequency (Obs): 3-5x/week  Number of Visits to Meet Established Goals: 3      CURRENT GOALS    Goal #1 Patient is able to demonstrate supine - sit EOB @ level: supervision     Goal #2 Patient is able to demonstrate transfers EOB to/from BSC at assistance level: supervision     Goal #3 Patient is able to ambulate 150 feet with assist device: walker - rolling at assistance level: supervision     Goal #4    Goal #5     Goal #6    Goal Comments: Goals established on 1/26/2024    HOME SITUATION  Type of Home: Assisted living facility   Home Layout: One level                Lives With: Staff 24 hours  Drives: No  Patient Owned Equipment: Rolling walker       Prior Level of [place]: Pt lives at [facility]. Pt ambulates with RW. Pt has occasional assist from staff for LBD.     SUBJECTIVE  \"My arm does hurt.\"       OBJECTIVE  Precautions: Hard of hearing;Bed/chair alarm  Fall Risk: High fall risk    WEIGHT BEARING RESTRICTION  Weight Bearing Restriction: None                PAIN ASSESSMENT  Rating: Unable to rate  Location: L shoulder, ppm site  Management Techniques: Repositioning    COGNITION  Memory:  decreased recall of recent events and decreased short term memory  Following Commands:  follows one step commands consistently  Initiation: cues to initiate tasks  Safety Judgement:  decreased awareness of need for assistance    RANGE OF MOTION AND STRENGTH ASSESSMENT  Upper extremity ROM and strength are within functional limits     Lower extremity ROM is within functional limits     Lower extremity strength is within functional limits       BALANCE  Static Sitting: Fair +  Dynamic Sitting: Fair  Static Standing: Poor +  Dynamic Standing: Poor +    ADDITIONAL TESTS                                    ACTIVITY TOLERANCE                         O2 WALK       NEUROLOGICAL FINDINGS                        AM-PAC '6-Clicks' INPATIENT SHORT FORM - BASIC MOBILITY  How much difficulty does the patient currently have...  Patient Difficulty: Turning over in bed (including adjusting bedclothes, sheets and blankets)?: A Little   Patient Difficulty: Sitting down on and standing up from a chair with arms (e.g., wheelchair, bedside commode, etc.): A Little   Patient Difficulty: Moving from lying on back to sitting on the side of the bed?: A Little   How much help from another person does the patient currently need...   Help from  Another: Moving to and from a bed to a chair (including a wheelchair)?: A Little   Help from Another: Need to walk in hospital room?: A Little   Help from Another: Climbing 3-5 steps with a railing?: A Little       AM-PAC Score:  Raw Score: 18   Approx Degree of Impairment: 46.58%   Standardized Score (AM-PAC Scale): 43.63   CMS Modifier (G-Code): CK    FUNCTIONAL ABILITY STATUS  Gait Assessment   Functional Mobility/Gait Assessment  Gait Assistance: Contact guard assist  Distance (ft): 200  Assistive Device: Rolling walker  Pattern:  (slow sandra)    Skilled Therapy Provided     Bed Mobility:  Rolling: MIN  Supine to sit: MIN   Sit to supine: NT     Transfer Mobility:  Sit to stand: CGA   Stand to sit: CGA  Gait = CGA     Gait training:  Pt ambulates with slow sandra, excessive kyphosis, and poor foot clearance.   VC for posture and sequencing with RW.   No LOB noted.     Therapist's Comments: Reviewed ppm precautions and activity recommendations including RW and sling use.     Exercise/Education Provided:  Bed mobility  Energy conservation  Functional activity tolerated  Gait training  Posture  Strengthening  Transfer training    Patient End of Session: Up in chair;Needs met;Call light within reach;RN aware of session/findings;All patient questions and concerns addressed      Patient Evaluation Complexity Level:  History Moderate - 1 or 2 personal factors and/or co-morbidities   Examination of body systems Moderate - addressing a total of 3 or more elements   Clinical Presentation Low - Stable   Clinical Decision Making Low - Stable       PT Session Time: 25 minutes  Gait Training: 10 minutes

## 2024-01-26 NOTE — PLAN OF CARE
Assumed care of pt around 1930  DNAR/select  AxO x4 -forgetful at times/ hx dementia, up x1 and walker  R/A, up x1 and walker  V paced on tele, no cardiac symptoms  Left chest PPM site clean, dry, and intact, soft, mepilex in place, LUE in sling  IV ancef  Chronic zhao in place, continent of bowel  Fall precautions in place  Pt updated on plan of care, all needs met at this time            Problem: SAFETY ADULT - FALL  Goal: Free from fall injury  Description: INTERVENTIONS:  - Assess pt frequently for physical needs  - Identify cognitive and physical deficits and behaviors that affect risk of falls.  - [place] fall precautions as indicated by assessment.  - Educate pt/family on patient safety including physical limitations  - Instruct pt to call for assistance with activity based on assessment  - Modify environment to reduce risk of injury  - Provide assistive devices as appropriate  - Consider OT/PT consult to assist with strengthening/mobility  - Encourage toileting schedule  Outcome: Progressing     Problem: Patient/Family Goals  Goal: Patient/Family Long Term Goal  Description: Patient's Long Term Goal: \"go home\"     Interventions:  - medications as ordered by physician  - testing as ordered by physician   - See additional Care Plan goals for specific interventions  Outcome: Progressing  Goal: Patient/Family Short Term Goal  Description: Patient's Short Term Goal: \"maintain adequate HR\"     Interventions:   - medications as ordered by physician   - testing as ordered by physician   - see EP   - see cardiology   - See additional Care Plan goals for specific interventions  Outcome: Progressing     Problem: CARDIOVASCULAR - ADULT  Goal: Maintains optimal cardiac output and hemodynamic stability  Description: INTERVENTIONS:  - Monitor vital signs, rhythm, and trends  - Monitor for bleeding, hypotension and signs of decreased cardiac output  - Evaluate effectiveness of vasoactive medications to optimize  hemodynamic stability  - Monitor arterial and/or venous puncture sites for bleeding and/or hematoma  - Assess quality of pulses, skin color and temperature  - Assess for signs of decreased coronary artery perfusion - ex. Angina  - Evaluate fluid balance, assess for edema, trend weights  Outcome: Progressing  Goal: Absence of cardiac arrhythmias or at baseline  Description: INTERVENTIONS:  - Continuous cardiac monitoring, monitor vital signs, obtain 12 lead EKG if indicated  - Evaluate effectiveness of antiarrhythmic and heart rate control medications as ordered  - Initiate emergency measures for life threatening arrhythmias  - Monitor electrolytes and administer replacement therapy as ordered  Outcome: Progressing

## 2024-01-26 NOTE — CONSULTS
[facility]    PATIENT'S NAME: SHARON MCCOLLUM   ATTENDING PHYSICIAN: Kapil Ray MD   CONSULTING PHYSICIAN: Tyree Carlton M.D.   PATIENT ACCOUNT#:   [id number]    LOCATION:  [address] [place] [address]  MEDICAL RECORD #:   [id number]       YOB: 1940  ADMISSION DATE:       01/23/2024      CONSULT DATE:  01/25/2024    REPORT OF CONSULTATION    ELECTROPHYSIOLOGY CONSULT    HISTORY OF PRESENT ILLNESS:  This is an 83-year-old patient we are asked to see for pacemaker therapy in light of her 2:heart block.  Patient suffers from dementia.  Some of the history was obtained from the patient's daughters at the bedside.  She resides in a nursing facility and was found to be bradycardic.  With her dementia, symptoms are challenging, but she does admit to some dizziness.  She denies chest pain, heaviness in her chest, or shortness of breath.  The family is unaware of other cardiac history, specifically denying MI, congestive heart failure, rheumatic fever, daniel syncope, leg edema, or orthopnea.    Her presenting EKG was sinus rhythm with 2:1 heart block with underlying right bundle-branch block.    CARDIAC RISK FACTORS:  Negative for tobacco.  She has hypertension but no dyslipidemia or diabetes.    PAST MEDICAL HISTORY:  Hypertension.  Migraines.  GERD.  Dementia.  Bilateral hip replacements.  Herniorrhaphy.  Tonsillectomy.    MEDICATIONS:  Current medications are Lasix, Aldactone, ibuprofen, alprazolam, lisinopril.    ALLERGIES:  Levaquin, adhesive, Neosporin, sulfa antibiotics.    SOCIAL HISTORY:  Patient is living in a nursing home.  No significant alcohol or excessive caffeine intake.  Daughters are involved in her care.    FAMILY HISTORY:  Negative for premature CAD.    REVIEW OF SYSTEMS:  Obtained, but limited due to patient's dementia.  No history of thyroid disease and recent TSH was normal.      PHYSICAL EXAMINATION:    GENERAL:  She is a pleasant lady who struggles with facts, in no distress.    VITAL SIGNS:  Blood pressure is 138/76, pulse is 34.   HEENT:  Head is normocephalic.  Eyes are nonicteric.  Oral mucosa is moist.  NECK:  No JVD or carotid bruits.  Thyroid is small.  LUNGS:  Clear.  HEART:  A bradycardic rhythm.  No significant murmurs or extra heart sounds.  Left chest is without lesions or deformities.  ABDOMEN:  Soft and benign.  EXTREMITIES:  No edema or gross musculoskeletal defects.  NEUROLOGIC:  She is alert, confused.  PSYCHIATRIC:  Noncombative.    LABORATORY DATA:  Reviewed in the HPI.  Additional laboratory data include hemoglobin of 11, white count is normal.  Electrolytes are normal.  Creatinine is normal.      Chest x-ray is no acute disease.      Echocardiogram from yesterday has an EF of 60% to 65%, no significant valvular heart disease.    IMPRESSION:    1.   2:1 heart block with baseline right bundle-branch block.  2.   Dizziness.  3.   Hypertension.  4.   Dementia.    PLAN:    1.   Reviewed with patient and daughters at the bedside the role of a permanent pacemaker and nature of implant.  Alternative was reviewed, which was no pacemaker.  Risks of the procedure including bleeding, infection, collapsed lung, lead dislodgement were reviewed.  Questions were answered.  2.   Blood pressure is controlled.  No change in therapy.  3.   Continue telemetry.     Dictated By Tyree Carlton M.D.  d: 01/25/2024 18:08:28  t: 01/25/2024 18:45:47  Job [id number]  MON/

## 2024-01-26 NOTE — PROGRESS NOTES
[facility] Pharmacy Note:  Renal Dose Adjustment for enoxaparin (LOVENOX)    Evelyn Reeves has been prescribed enoxaparin that was renally adjusted to 30 mg subcutaneously every 24 hours.    Estimated Creatinine Clearance: 43.6 mL/min (based on SCr of 0.75 mg/dL).    Calculated CrCl greater than 30 mL/min so the dose of Enoxaparin (LOVENOX) has been changed back to the original dose of enoxaparin 40 mg every 24 hours per P&T approved protocol.  Pharmacy will continue to follow, and make additional adjustments if needed.      Thank you,  Roselyn Henning, PharmD  1/25/2024 7:07 PM

## 2024-01-26 NOTE — PLAN OF CARE
Assumed patient care around 0730 this AM. Patient A&O x3-4, forgetful overnight per report. SPO2 maintained on RA, no c/o SOB, lung sounds clear. Vpaced on tele s/p PPM insertion yesterday. Left chest site soft, no hematoma, dressing cdi. Pt reports pain, PRN Tylenol provided. Device rep at bedside this AM. CXR pending. Chronic zhao in place, continent of bowel. Up with assist and walker. PT rec return home with [facility]. Updated on POC, needs met.     1715: Report called to TATIANNA Nolen at [facility]. Reviewed site care.     1730: Explained discharge instructions including medications and follow ups to the patient and her daughter Earlene at bedside, verbalize understanding. No medication changes. Reviewed PPM site care. PPM id card given to daughter. Zhao changed over to leg bag, zhao care provided. PIV removed, tele monitor discontinued, transported via wheelchair to [facility] for dc to AL with [facility].     Problem: CARDIOVASCULAR - ADULT  Goal: Maintains optimal cardiac output and hemodynamic stability  Description: INTERVENTIONS:  - Monitor vital signs, rhythm, and trends  - Monitor for bleeding, hypotension and signs of decreased cardiac output  - Evaluate effectiveness of vasoactive medications to optimize hemodynamic stability  - Monitor arterial and/or venous puncture sites for bleeding and/or hematoma  - Assess quality of pulses, skin color and temperature  - Assess for signs of decreased coronary artery perfusion - ex. Angina  - Evaluate fluid balance, assess for edema, trend weights  Outcome: Progressing  Goal: Absence of cardiac arrhythmias or at baseline  Description: INTERVENTIONS:  - Continuous cardiac monitoring, monitor vital signs, obtain 12 lead EKG if indicated  - Evaluate effectiveness of antiarrhythmic and heart rate control medications as ordered  - Initiate emergency measures for life threatening arrhythmias  - Monitor electrolytes and administer replacement therapy as ordered  Outcome:  Progressing     Problem: SAFETY ADULT - FALL  Goal: Free from fall injury  Description: INTERVENTIONS:  - Assess pt frequently for physical needs  - Identify cognitive and physical deficits and behaviors that affect risk of falls.  - [place] fall precautions as indicated by assessment.  - Educate pt/family on patient safety including physical limitations  - Instruct pt to call for assistance with activity based on assessment  - Modify environment to reduce risk of injury  - Provide assistive devices as appropriate  - Consider OT/PT consult to assist with strengthening/mobility  - Encourage toileting schedule  Outcome: Progressing

## 2024-01-26 NOTE — PROGRESS NOTES
[facility]    Progress note     Evelyn Reeves Patient Status:  Emergency    1940 MRN [id number]   Location [facility] EMERGENCY DEPARTMENT Attending Steffen Bee,    Hosp Day # 2 PCP Brendan Love MD     Subjective: Remains in heart block. Feels well, without any CV complaint.     History of Present Illness:  Evelyn Reeves is a a(n) 83 year old female with history of HTN who presents today with a heart block. She was told at her skilled nursing facility that she was bradycardic.  Facility wanted an EKG. Was establishing care with cardiology earlier this afternoon when she was found to be be in 2:1 AVB with a rate 40.  She also has an underlying RBBB and 1st degree AVB by clinic EKG.       History:  Past Medical History:   Diagnosis Date    Abdominal hernia     Abdominal pain     Anemia     Anxiety     Arthritis     Atypical mole     Back pain     Bad breath     Bloating     Blurred vision     Constipation     Depressed state     never required treatment    Diarrhea, unspecified     Easy bruising     Enlarged lymph node     Flatulence/gas pain/belching     Frequent use of laxatives     GERD (gastroesophageal reflux disease)     Dr. Valencia EGS adn colosncopy 2010    Hearing loss     Hemorrhoids     History of cardiac murmur     Hoarseness, chronic     per patient from Allergies    Indigestion     Irregular bowel habits     Migraine headache     Nausea     Night sweats     Osteoporosis     Pain in joints     Pain with bowel movements     Painful urination     Presence of other cardiac implants and grafts     2 hip replacements    Stress     Uncomfortable fullness after meals     Urinary retention     Weight loss      Past Surgical History:   Procedure Laterality Date    COLONOSCOPY N/A 2021    Procedure: colonoscopy;  Surgeon: Reagan Watt MD;  Location:  ENDOSCOPY    DRAIN/INJECT LARGE JOINT/BURSA  2013    Procedure: HIP INJECTION (PAIN);  Surgeon: Erick  MD Julián;  Location: [facility]    FLUOROSCOPIC GUIDANCE NEEDLE PLACEMENT  8/22/2013    Procedure: HIP INJECTION (PAIN);  Surgeon: Julián Suarez MD;  Location: [facility] PAIN MANAGEMENT    HIP REPLACEMENT SURGERY      bilateral   Dr turner  right 2015  left 2016     OTHER      R THR    OTHER SURGICAL HISTORY      hernia 1968    OTHER SURGICAL HISTORY      breast biopsy 1980    OTHER SURGICAL HISTORY  04/25/2014    flow us- Dr. Ron    OTHER SURGICAL HISTORY  07/01/2019    cysto dr moreno    OTHER SURGICAL HISTORY  01/12/2022    cystoscopy-     PATIENT DOCUMENTED NOT TO HAVE EXPERIENCED ANY OF THE FOLLOWING EVENTS  8/22/2013    Procedure: HIP INJECTION (PAIN);  Surgeon: Julián Suarez MD;  Location: [facility] PAIN MANAGEMENT    PATIENT WITHOUGH PREOPERATIVE ORDER FOR IV ANTIBIOTIC SURGICAL SITE INFECTION PROPHYLAXIS.  8/22/2013    Procedure: HIP INJECTION (PAIN);  Surgeon: Julián Suarez MD;  Location: [facility] PAIN MANAGEMENT    TONSILLECTOMY       Family History   Problem Relation Age of Onset    Cancer Father         prostate and throat    Heart Disorder Mother     Heart Attack Mother     Other (Parkinson's Dz) Mother 57      reports that she has never smoked. She has never used smokeless tobacco. She reports current alcohol use of about 5.0 standard drinks of alcohol per week. She reports that she does not use drugs.    Allergies:  Allergies   Allergen Reactions    Levaquin [Levofloxacin] PAIN     Tendonitis of the Achilles tendons mild    Adhesive Tape OTHER (SEE COMMENTS)     Welquincy    Neosporin Original [Neomycin-Bacitracin-Polymyxin] UNKNOWN     Pt reports she was not sure of the reaction, it was 15 yrs ago.  Tolerates polysporin    Sulfa Antibiotics UNKNOWN     \"Did not feel good.\" Daughters do not believe patient is allergic. Bactrim taken many times according to med history.       Medications:    Current Facility-Administered Medications:      glycerin-hypromellose- (Artifical Tears) 0.2-0.2-1 % ophthalmic solution 1 drop, 1 drop, Both Eyes, QID PRN    ondansetron (Zofran) 4 MG/2ML injection 4 mg, 4 mg, Intravenous, Q6H PRN    enoxaparin (Lovenox) 40 MG/0.4ML SUBQ injection 40 mg, 40 mg, Subcutaneous, Daily    furosemide (Lasix) tab 40 mg, 40 mg, Oral, Daily    lisinopril (Prinivil; Zestril) tab 40 mg, 40 mg, Oral, Daily    spironolactone (Aldactone) partial tab 12.5 mg, 12.5 mg, Oral, Daily    ALPRAZolam (Xanax) tab 0.25 mg, 0.25 mg, Oral, Nightly PRN    magnesium hydroxide (Milk of Magnesia) 400 MG/5ML oral suspension 30 mL, 30 mL, Oral, Daily PRN    acetaminophen (Tylenol Extra Strength) tab 500 mg, 500 mg, Oral, Q4H PRN    melatonin tab 3 mg, 3 mg, Oral, Nightly PRN    polyethylene glycol (PEG 3350) (Miralax) 17 g oral packet 17 g, 17 g, Oral, Daily PRN    sennosides (Senokot) tab 17.2 mg, 17.2 mg, Oral, Nightly PRN    bisacodyl (Dulcolax) 10 MG rectal suppository 10 mg, 10 mg, Rectal, Daily PRN    Review of Systems:  All systems were reviewed and are negative except as described above in HPI.    Physical Exam:  Blood pressure 126/52, pulse 72, temperature 98.2 °F (36.8 °C), temperature source Oral, resp. rate 18, height 61\", weight 134 lb 14.7 oz (61.2 kg), SpO2 98%, not currently breastfeeding.  Temp (24hrs), Av.2 °F (36.8 °C), Min:98 °F (36.7 °C), Max:98.3 °F (36.8 °C)    Wt Readings from Last 3 Encounters:   24 134 lb 14.7 oz (61.2 kg)   22 138 lb 9.6 oz (62.9 kg)   22 138 lb 9.6 oz (62.9 kg)       Telemetry: Second degree avb    Physical Exam  Vitals reviewed.   Constitutional:       General: She is not in acute distress.     Appearance: Normal appearance. She is well-developed and well-groomed. She is not ill-appearing, toxic-appearing or diaphoretic.   Cardiovascular:      Rate and Rhythm: Regular rhythm. Bradycardia present.      Heart sounds: No murmur heard.  Pulmonary:      Effort: Pulmonary effort is normal.       Breath sounds: Normal breath sounds.   Musculoskeletal:      Right lower leg: Edema (trace) present.      Left lower leg: Edema (trace) present.   Skin:     General: Skin is warm and dry.      Capillary Refill: Capillary refill takes less than 2 seconds.   Neurological:      General: No focal deficit present.      Mental Status: She is alert and oriented to person, place, and time.   Psychiatric:         Attention and Perception: Attention normal.         Mood and Affect: Mood normal.         Behavior: Behavior is cooperative.         Laboratories and Data:  Diagnostics:  EK2024  Sinus rhythm with 2nd degree A-V block with 2:1 A-V conduction   Possible Left atrial enlargement   Right bundle branch block   Septal infarct (cited on or before 2022)   Abnormal ECG   When compared with ECG of 10-AUG-2022 13:14,   Sinus rhythm is now with 2nd degree A-V block   Vent. rate has decreased BY  43 BPM     Labs:   Lab Results   Component Value Date    WBC 8.2 2024    RBC 3.59 2024    HGB 12.0 2024    HCT 35.3 2024    MCV 98.3 2024    MCH 33.4 2024    MCHC 34.0 2024    RDW 11.7 2024    .0 2024     Lab Results   Component Value Date    INR 0.96 2024    INR 1.0 2015    INR 1.0 2014       Assessment/Plan:    2nd degree AVB type 2  2:1 conduction   - Monitor on telemetry   Echocardiogram pending   - plan for pacemaker today.  - Discussed with patient and daughters who agree with plan of care.     HTN  - Continue home lisinopril 40 mg daily     LE edema  Suspect CHF component  - well compensated on exam   - echo pending   - Pro BNP normal   - Continue home lasix 40mg daily   - continue home ingrid 25mg daily      YUNIOR LR

## 2024-01-26 NOTE — PROGRESS NOTES
[facility]    Progress note     Evelyn Reeves Patient Status:  Emergency    1940 MRN [id number]   Location [facility] EMERGENCY DEPARTMENT Attending Steffen Bee,    Hosp Day # 3 PCP Brendan Love MD     Subjective: Pacer in place by Bianka LR.  Went well.  No complications.  Feels well, without any CV complaint.     History of Present Illness:  Evelyn Reeves is a a(n) 83 year old female with history of HTN who presents today with a heart block. She was told at her skilled nursing facility that she was bradycardic.  Facility wanted an EKG. Was establishing care with cardiology earlier this afternoon when she was found to be be in 2:1 AVB with a rate 40.  She also has an underlying RBBB and 1st degree AVB by clinic EKG.       History:  Past Medical History:   Diagnosis Date    Abdominal hernia     Abdominal pain     Anemia     Anxiety     Arthritis     Atypical mole     Back pain     Bad breath     Bloating     Blurred vision     Constipation     Depressed state     never required treatment    Diarrhea, unspecified     Easy bruising     Enlarged lymph node     Flatulence/gas pain/belching     Frequent use of laxatives     GERD (gastroesophageal reflux disease)     Dr. Valencia EGS adn colosncopy 2010    Hearing loss     Hemorrhoids     History of cardiac murmur     Hoarseness, chronic     per patient from Allergies    Indigestion     Irregular bowel habits     Migraine headache     Nausea     Night sweats     Osteoporosis     Pain in joints     Pain with bowel movements     Painful urination     Presence of other cardiac implants and grafts     2 hip replacements    Stress     Uncomfortable fullness after meals     Urinary retention     Weight loss      Past Surgical History:   Procedure Laterality Date    COLONOSCOPY N/A 2021    Procedure: colonoscopy;  Surgeon: Reagan Watt MD;  Location:  ENDOSCOPY    DRAIN/INJECT LARGE JOINT/BURSA  2013    Procedure: HIP  INJECTION (PAIN);  Surgeon: Julián Suarez MD;  Location: [facility]    FLUOROSCOPIC GUIDANCE NEEDLE PLACEMENT  8/22/2013    Procedure: HIP INJECTION (PAIN);  Surgeon: Julián Suarez MD;  Location: [facility] PAIN MANAGEMENT    HIP REPLACEMENT SURGERY      bilateral   Dr turner  right 2015  left 2016     OTHER      R THR    OTHER SURGICAL HISTORY      hernia 1968    OTHER SURGICAL HISTORY      breast biopsy 1980    OTHER SURGICAL HISTORY  04/25/2014    flow us- Dr. Ron    OTHER SURGICAL HISTORY  07/01/2019    cysto dr moreno    OTHER SURGICAL HISTORY  01/12/2022    cystoscopy-     PATIENT DOCUMENTED NOT TO HAVE EXPERIENCED ANY OF THE FOLLOWING EVENTS  8/22/2013    Procedure: HIP INJECTION (PAIN);  Surgeon: Julián Suarez MD;  Location: [facility] PAIN [facility]    PATIENT WITHOUGH PREOPERATIVE ORDER FOR IV ANTIBIOTIC SURGICAL SITE INFECTION PROPHYLAXIS.  8/22/2013    Procedure: HIP INJECTION (PAIN);  Surgeon: Julián Suarez MD;  Location: [facility] PAIN MANAGEMENT    TONSILLECTOMY       Family History   Problem Relation Age of Onset    Cancer Father         prostate and throat    Heart Disorder Mother     Heart Attack Mother     Other (Parkinson's Dz) Mother 57      reports that she has never smoked. She has never used smokeless tobacco. She reports current alcohol use of about 5.0 standard drinks of alcohol per week. She reports that she does not use drugs.    Allergies:  Allergies   Allergen Reactions    Levaquin [Levofloxacin] PAIN     Tendonitis of the Achilles tendons mild    Adhesive Tape OTHER (SEE COMMENTS)     Welts    Neosporin Original [Neomycin-Bacitracin-Polymyxin] UNKNOWN     Pt reports she was not sure of the reaction, it was 15 yrs ago.  Tolerates polysporin    Sulfa Antibiotics UNKNOWN     \"Did not feel good.\" Daughters do not believe patient is allergic. Bactrim taken many times according to med history.       Medications:    Current  Facility-Administered Medications:     glycerin-hypromellose- (Artifical Tears) 0.2-0.2-1 % ophthalmic solution 1 drop, 1 drop, Both Eyes, QID PRN    ondansetron (Zofran) 4 MG/2ML injection 4 mg, 4 mg, Intravenous, Q6H PRN    enoxaparin (Lovenox) 40 MG/0.4ML SUBQ injection 40 mg, 40 mg, Subcutaneous, Daily    furosemide (Lasix) tab 40 mg, 40 mg, Oral, Daily    lisinopril (Prinivil; Zestril) tab 40 mg, 40 mg, Oral, Daily    spironolactone (Aldactone) partial tab 12.5 mg, 12.5 mg, Oral, Daily    ALPRAZolam (Xanax) tab 0.25 mg, 0.25 mg, Oral, Nightly PRN    magnesium hydroxide (Milk of Magnesia) 400 MG/5ML oral suspension 30 mL, 30 mL, Oral, Daily PRN    acetaminophen (Tylenol Extra Strength) tab 500 mg, 500 mg, Oral, Q4H PRN    melatonin tab 3 mg, 3 mg, Oral, Nightly PRN    polyethylene glycol (PEG 3350) (Miralax) 17 g oral packet 17 g, 17 g, Oral, Daily PRN    sennosides (Senokot) tab 17.2 mg, 17.2 mg, Oral, Nightly PRN    bisacodyl (Dulcolax) 10 MG rectal suppository 10 mg, 10 mg, Rectal, Daily PRN    Review of Systems:  All systems were reviewed and are negative except as described above in HPI.    Physical Exam:  Blood pressure 126/52, pulse 72, temperature 98.2 °F (36.8 °C), temperature source Oral, resp. rate 18, height 61\", weight 134 lb 14.7 oz (61.2 kg), SpO2 98%, not currently breastfeeding.  Temp (24hrs), Av.2 °F (36.8 °C), Min:98 °F (36.7 °C), Max:98.3 °F (36.8 °C)    Wt Readings from Last 3 Encounters:   24 134 lb 14.7 oz (61.2 kg)   22 138 lb 9.6 oz (62.9 kg)   22 138 lb 9.6 oz (62.9 kg)       Telemetry: Second degree avb    Physical Exam  Vitals reviewed.   Constitutional:       General: She is not in acute distress.     Appearance: Normal appearance. She is well-developed and well-groomed. She is not ill-appearing, toxic-appearing or diaphoretic.   Cardiovascular:      Rate and Rhythm: Regular rhythm. Bradycardia present.      Heart sounds: No murmur heard.  Pulmonary:       Effort: Pulmonary effort is normal.      Breath sounds: Normal breath sounds.   Musculoskeletal:      Right lower leg: Edema (trace) present.      Left lower leg: Edema (trace) present.   Skin:     General: Skin is warm and dry.      Capillary Refill: Capillary refill takes less than 2 seconds.   Neurological:      General: No focal deficit present.      Mental Status: She is alert and oriented to person, place, and time.   Psychiatric:         Attention and Perception: Attention normal.         Mood and Affect: Mood normal.         Behavior: Behavior is cooperative.         Laboratories and Data:  Diagnostics:  EK2024  Sinus rhythm with 2nd degree A-V block with 2:1 A-V conduction   Possible Left atrial enlargement   Right bundle branch block   Septal infarct (cited on or before 2022)   Abnormal ECG   When compared with ECG of 10-AUG-2022 13:14,   Sinus rhythm is now with 2nd degree A-V block   Vent. 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  Continue outpatient meds unchanged.    YUNIOR LR

## 2024-01-26 NOTE — PROCEDURES
[facility]    PATIENT'S NAME: SHARON MCCOLLUM   ATTENDING PHYSICIAN: Kapil Ray MD   OPERATING PHYSICIAN: Tyree Carlton M.D.   PATIENT ACCOUNT#:   [id number]    LOCATION:  [address] [place] [address]  MEDICAL RECORD #:   [id number]       YOB: 1940  ADMISSION DATE:       01/23/2024      OPERATION DATE:  01/25/2024    CARDIAC PROCEDURE TRANSCRIPTION    PACEMAKER IMPLANT    PREOPERATIVE DIAGNOSIS:    POSTOPERATIVE DIAGNOSIS:    PROCEDURE PERFORMED:  Implantation of Medtronic AV pacemaker.    HISTORY:  This is an 83-year-old patient who is admitted for bradycardia, found to be in 2:1 heart block with baseline right bundle-branch block.  She has dizziness.  Suffers from mild dementia.    PROCEDURE:  Patient brought to EP laboratory in a fasting state.  She was assessed ASA 2, Mallampati II.  Blood pressure, heart rate, telemetry, O2, CO2 would all be monitored.  She would be observed by myself and the nursing staff.  Sedation would begin at 1722.  Under my direct supervision, she would receive 2 of Versed, 50 of fentanyl, and 25 of Benadryl in divided doses.  The procedure would be finished at 1811.    Her left chest was prepped and draped in the usual sterile fashion.  After local anesthesia, an incision was made in the left deltopectoral groove.  The cephalic vein was isolated and the distal end was tied off.  A venotomy was made and a Medtronic model 4074-5A, serial number [id number], was placed in the right ventricular apex.  In its final position, it was tested, functioning well, and sutured in place.    An 18-gauge needle was placed into the left subclavian vein.  Through this needle, a wire was advanced under fluoroscopic guidance to the IVC.  Over this wire, a 6-Comoran sheath was placed and, through this sheath, a Medtronic model 4076-45, serial number [id number], was placed in the high right atrium.  In its final position, it was tested, functioning well, and sutured in place.    A pocket  was made inferomedial to the incision in the prepectoral space.  This pocket was examined for bleeding and clots, and none were evident.  It was irrigated with antibiotic solution.    The leads were each tested at 10 mV and there was no diaphragmatic stimulation, there was none.  They were attached to a Medtronic model W1DR01, serial number [id number].  This was sutured in place and the leads were tested through the device.  P waves were 3.8 with an impedance of 570 and a threshold of 0.75 V at 0.4 ms.  RV waves were 5.1 with an impedance of 1083 and a threshold of 0.5 V at 0.4 ms.    The pocket was closed with interrupted 2-0 Vicryl and reinforced with running 3-0 Vicryl.  The skin was closed with subcuticular 4-0 Dexon and reinforced with Steri-Strips.  The patient tolerated the procedure well and was transferred back to her room in stable condition.    IMPRESSION:  Successful implantation of a Medtronic AV pacemaker.  Atrial lead through the left subclavian vein, ventricular lead through the left cephalic vein.     Dictated By Tyree Carlton M.D.  d: 01/25/2024 18:14:00  t: 01/25/2024 18:56:41  [facility] [id number]  MON/

## 2024-01-27 ENCOUNTER — HOSPITAL ENCOUNTER (INPATIENT)
Facility: HOSPITAL | Age: 84
LOS: 2 days | Discharge: NURSING, REHAB OR LONG-TERM CARE | End: 2024-01-29
Attending: STUDENT IN AN ORGANIZED HEALTH CARE EDUCATION/TRAINING PROGRAM | Admitting: HOSPITALIST

## 2024-01-27 ENCOUNTER — HOSPITAL ENCOUNTER (INPATIENT)
Facility: HOSPITAL | Age: 84
LOS: 2 days | Discharge: HOME HEALTH | DRG: 699 | End: 2024-01-29
Attending: STUDENT IN AN ORGANIZED HEALTH CARE EDUCATION/TRAINING PROGRAM | Admitting: HOSPITALIST

## 2024-01-27 DIAGNOSIS — N18.9 ACUTE RENAL FAILURE SUPERIMPOSED ON CHRONIC KIDNEY DISEASE, UNSPECIFIED ACUTE RENAL FAILURE TYPE, UNSPECIFIED CKD STAGE (HCC): Primary | ICD-10-CM

## 2024-01-27 DIAGNOSIS — N17.9 ACUTE RENAL FAILURE SUPERIMPOSED ON CHRONIC KIDNEY DISEASE, UNSPECIFIED ACUTE RENAL FAILURE TYPE, UNSPECIFIED CKD STAGE (HCC): Primary | ICD-10-CM

## 2024-01-27 LAB
ALBUMIN SERPL-MCNC: 3.5 G/DL (ref 3.4–5)
ALBUMIN/GLOB SERPL: 1.1 {RATIO} (ref 1–2)
ALP LIVER SERPL-CCNC: 83 U/L
ANION GAP SERPL CALC-SCNC: 4 MMOL/L (ref 0–18)
AST SERPL-CCNC: 19 U/L (ref 15–37)
BASOPHILS # BLD AUTO: 0.05 X10(3) UL (ref 0–0.2)
BASOPHILS NFR BLD AUTO: 0.6 %
BILIRUB SERPL-MCNC: 0.4 MG/DL (ref 0.1–2)
BILIRUB UR QL STRIP.AUTO: NEGATIVE
BILIRUB UR QL STRIP.AUTO: NEGATIVE
BUN BLD-MCNC: 61 MG/DL (ref 9–23)
CALCIUM BLD-MCNC: 9.2 MG/DL (ref 8.5–10.1)
CHLORIDE SERPL-SCNC: 103 MMOL/L (ref 98–112)
CK SERPL-CCNC: 126 U/L
CO2 SERPL-SCNC: 29 MMOL/L (ref 21–32)
COLOR UR AUTO: YELLOW
CREAT BLD-MCNC: 2.86 MG/DL
EGFRCR SERPLBLD CKD-EPI 2021: 16 ML/MIN/1.73M2 (ref 60–?)
EOSINOPHIL # BLD AUTO: 0.14 X10(3) UL (ref 0–0.7)
EOSINOPHIL NFR BLD AUTO: 1.6 %
ERYTHROCYTE [DISTWIDTH] IN BLOOD BY AUTOMATED COUNT: 11.9 %
GLOBULIN PLAS-MCNC: 3.1 G/DL (ref 2.8–4.4)
GLUCOSE BLD-MCNC: 107 MG/DL (ref 70–99)
GLUCOSE UR STRIP.AUTO-MCNC: NEGATIVE MG/DL
GLUCOSE UR STRIP.AUTO-MCNC: NORMAL MG/DL
HCT VFR BLD AUTO: 34.7 %
HGB BLD-MCNC: 11.7 G/DL
HYALINE CASTS #/AREA URNS AUTO: PRESENT /LPF
IMM GRANULOCYTES # BLD AUTO: 0.05 X10(3) UL (ref 0–1)
IMM GRANULOCYTES NFR BLD: 0.6 %
KETONES UR STRIP.AUTO-MCNC: NEGATIVE MG/DL
LEUKOCYTE ESTERASE UR QL STRIP.AUTO: 500
LYMPHOCYTES # BLD AUTO: 1.74 X10(3) UL (ref 1–4)
LYMPHOCYTES NFR BLD AUTO: 19.4 %
MCH RBC QN AUTO: 33.9 PG (ref 26–34)
MCHC RBC AUTO-ENTMCNC: 33.7 G/DL (ref 31–37)
MCV RBC AUTO: 100.6 FL
MONOCYTES # BLD AUTO: 0.91 X10(3) UL (ref 0.1–1)
MONOCYTES NFR BLD AUTO: 10.1 %
NEUTROPHILS # BLD AUTO: 6.08 X10 (3) UL (ref 1.5–7.7)
NEUTROPHILS # BLD AUTO: 6.08 X10(3) UL (ref 1.5–7.7)
NEUTROPHILS NFR BLD AUTO: 67.7 %
NITRITE UR QL STRIP.AUTO: NEGATIVE
NITRITE UR QL STRIP.AUTO: NEGATIVE
OSMOLALITY SERPL CALC.SUM OF ELEC: 300 MOSM/KG (ref 275–295)
PH UR STRIP.AUTO: 5 [PH] (ref 5–8)
PH UR STRIP.AUTO: 5.5 [PH] (ref 5–8)
PLATELET # BLD AUTO: 200 10(3)UL (ref 150–450)
POTASSIUM SERPL-SCNC: 4.2 MMOL/L (ref 3.5–5.1)
PROT SERPL-MCNC: 6.6 G/DL (ref 6.4–8.2)
PROT UR STRIP.AUTO-MCNC: 30 MG/DL
RBC # BLD AUTO: 3.45 X10(6)UL
SARS-COV-2 RNA RESP QL NAA+PROBE: NOT DETECTED
SODIUM SERPL-SCNC: 136 MMOL/L (ref 136–145)
SP GR UR STRIP.AUTO: 1.02 (ref 1–1.03)
SP GR UR STRIP.AUTO: 1.02 (ref 1–1.03)
UROBILINOGEN UR STRIP.AUTO-MCNC: 0.2 MG/DL
UROBILINOGEN UR STRIP.AUTO-MCNC: NORMAL MG/DL
WBC # BLD AUTO: 9 X10(3) UL (ref 4–11)
WBC #/AREA URNS AUTO: >50 /HPF
WBC CLUMPS UR QL AUTO: PRESENT /HPF

## 2024-01-27 PROCEDURE — 85025 COMPLETE CBC W/AUTO DIFF WBC: CPT | Performed by: STUDENT IN AN ORGANIZED HEALTH CARE EDUCATION/TRAINING PROGRAM

## 2024-01-27 PROCEDURE — 81001 URINALYSIS AUTO W/SCOPE: CPT | Performed by: STUDENT IN AN ORGANIZED HEALTH CARE EDUCATION/TRAINING PROGRAM

## 2024-01-27 PROCEDURE — 82570 ASSAY OF URINE CREATININE: CPT | Performed by: INTERNAL MEDICINE

## 2024-01-27 PROCEDURE — 87086 URINE CULTURE/COLONY COUNT: CPT | Performed by: INTERNAL MEDICINE

## 2024-01-27 PROCEDURE — 81015 MICROSCOPIC EXAM OF URINE: CPT | Performed by: STUDENT IN AN ORGANIZED HEALTH CARE EDUCATION/TRAINING PROGRAM

## 2024-01-27 PROCEDURE — 99285 EMERGENCY DEPT VISIT HI MDM: CPT

## 2024-01-27 PROCEDURE — 80053 COMPREHEN METABOLIC PANEL: CPT | Performed by: STUDENT IN AN ORGANIZED HEALTH CARE EDUCATION/TRAINING PROGRAM

## 2024-01-27 PROCEDURE — 96360 HYDRATION IV INFUSION INIT: CPT

## 2024-01-27 PROCEDURE — 87186 SC STD MICRODIL/AGAR DIL: CPT | Performed by: INTERNAL MEDICINE

## 2024-01-27 PROCEDURE — 82550 ASSAY OF CK (CPK): CPT | Performed by: INTERNAL MEDICINE

## 2024-01-27 PROCEDURE — 81001 URINALYSIS AUTO W/SCOPE: CPT | Performed by: INTERNAL MEDICINE

## 2024-01-27 PROCEDURE — 87077 CULTURE AEROBIC IDENTIFY: CPT | Performed by: INTERNAL MEDICINE

## 2024-01-27 PROCEDURE — 84300 ASSAY OF URINE SODIUM: CPT | Performed by: INTERNAL MEDICINE

## 2024-01-27 RX ORDER — SODIUM CHLORIDE 9 MG/ML
INJECTION, SOLUTION INTRAVENOUS CONTINUOUS
Status: DISCONTINUED | OUTPATIENT
Start: 2024-01-27 | End: 2024-01-27

## 2024-01-27 RX ORDER — SODIUM CHLORIDE 9 MG/ML
INJECTION, SOLUTION INTRAVENOUS CONTINUOUS
Status: CANCELLED | OUTPATIENT
Start: 2024-01-27 | End: 2024-01-28

## 2024-01-28 ENCOUNTER — APPOINTMENT (OUTPATIENT)
Dept: ULTRASOUND IMAGING | Facility: HOSPITAL | Age: 84
DRG: 699 | End: 2024-01-28
Attending: INTERNAL MEDICINE

## 2024-01-28 ENCOUNTER — APPOINTMENT (OUTPATIENT)
Dept: ULTRASOUND IMAGING | Facility: HOSPITAL | Age: 84
End: 2024-01-28
Attending: INTERNAL MEDICINE

## 2024-01-28 LAB
ANION GAP SERPL CALC-SCNC: 5 MMOL/L (ref 0–18)
BASOPHILS # BLD AUTO: 0.05 X10(3) UL (ref 0–0.2)
BASOPHILS NFR BLD AUTO: 0.8 %
BUN BLD-MCNC: 51 MG/DL (ref 9–23)
CALCIUM BLD-MCNC: 8.6 MG/DL (ref 8.5–10.1)
CHLORIDE SERPL-SCNC: 111 MMOL/L (ref 98–112)
CO2 SERPL-SCNC: 26 MMOL/L (ref 21–32)
CREAT BLD-MCNC: 1.73 MG/DL
CREAT UR-SCNC: 130 MG/DL
EGFRCR SERPLBLD CKD-EPI 2021: 29 ML/MIN/1.73M2 (ref 60–?)
EOSINOPHIL # BLD AUTO: 0.2 X10(3) UL (ref 0–0.7)
EOSINOPHIL NFR BLD AUTO: 3.1 %
ERYTHROCYTE [DISTWIDTH] IN BLOOD BY AUTOMATED COUNT: 11.9 %
GLUCOSE BLD-MCNC: 108 MG/DL (ref 70–99)
HCT VFR BLD AUTO: 32.2 %
HGB BLD-MCNC: 10.5 G/DL
IMM GRANULOCYTES # BLD AUTO: 0.02 X10(3) UL (ref 0–1)
IMM GRANULOCYTES NFR BLD: 0.3 %
LYMPHOCYTES # BLD AUTO: 1.62 X10(3) UL (ref 1–4)
LYMPHOCYTES NFR BLD AUTO: 25.4 %
MCH RBC QN AUTO: 33.4 PG (ref 26–34)
MCHC RBC AUTO-ENTMCNC: 32.6 G/DL (ref 31–37)
MCV RBC AUTO: 102.5 FL
MONOCYTES # BLD AUTO: 0.73 X10(3) UL (ref 0.1–1)
MONOCYTES NFR BLD AUTO: 11.4 %
NEUTROPHILS # BLD AUTO: 3.77 X10 (3) UL (ref 1.5–7.7)
NEUTROPHILS # BLD AUTO: 3.77 X10(3) UL (ref 1.5–7.7)
NEUTROPHILS NFR BLD AUTO: 59 %
OSMOLALITY SERPL CALC.SUM OF ELEC: 308 MOSM/KG (ref 275–295)
PLATELET # BLD AUTO: 161 10(3)UL (ref 150–450)
POTASSIUM SERPL-SCNC: 3.9 MMOL/L (ref 3.5–5.1)
RBC # BLD AUTO: 3.14 X10(6)UL
SODIUM SERPL-SCNC: 142 MMOL/L (ref 136–145)
SODIUM SERPL-SCNC: 28 MMOL/L
WBC # BLD AUTO: 6.4 X10(3) UL (ref 4–11)

## 2024-01-28 PROCEDURE — 80048 BASIC METABOLIC PNL TOTAL CA: CPT | Performed by: INTERNAL MEDICINE

## 2024-01-28 PROCEDURE — 85025 COMPLETE CBC W/AUTO DIFF WBC: CPT | Performed by: INTERNAL MEDICINE

## 2024-01-28 PROCEDURE — 76770 US EXAM ABDO BACK WALL COMP: CPT | Performed by: INTERNAL MEDICINE

## 2024-01-28 RX ORDER — ALPRAZOLAM 0.25 MG/1
0.25 TABLET ORAL NIGHTLY PRN
Status: DISCONTINUED | OUTPATIENT
Start: 2024-01-28 | End: 2024-01-29

## 2024-01-28 RX ORDER — ONDANSETRON 2 MG/ML
4 INJECTION INTRAMUSCULAR; INTRAVENOUS EVERY 6 HOURS PRN
Status: DISCONTINUED | OUTPATIENT
Start: 2024-01-28 | End: 2024-01-29

## 2024-01-28 RX ORDER — CALCIUM CARBONATE 500 MG/1
1000 TABLET, CHEWABLE ORAL EVERY 6 HOURS PRN
Status: DISCONTINUED | OUTPATIENT
Start: 2024-01-28 | End: 2024-01-29

## 2024-01-28 RX ORDER — SODIUM CHLORIDE 9 MG/ML
INJECTION, SOLUTION INTRAVENOUS CONTINUOUS
Status: DISCONTINUED | OUTPATIENT
Start: 2024-01-28 | End: 2024-01-28

## 2024-01-28 RX ORDER — HEPARIN SODIUM 5000 [USP'U]/ML
5000 INJECTION, SOLUTION INTRAVENOUS; SUBCUTANEOUS EVERY 8 HOURS SCHEDULED
Status: DISCONTINUED | OUTPATIENT
Start: 2024-01-28 | End: 2024-01-29

## 2024-01-28 RX ORDER — ACETAMINOPHEN 500 MG
500 TABLET ORAL EVERY 4 HOURS PRN
Status: DISCONTINUED | OUTPATIENT
Start: 2024-01-28 | End: 2024-01-29

## 2024-01-28 RX ORDER — POLYETHYLENE GLYCOL 3350 17 G/17G
17 POWDER, FOR SOLUTION ORAL DAILY PRN
Status: DISCONTINUED | OUTPATIENT
Start: 2024-01-28 | End: 2024-01-29

## 2024-01-28 RX ORDER — METOCLOPRAMIDE HYDROCHLORIDE 5 MG/ML
5 INJECTION INTRAMUSCULAR; INTRAVENOUS EVERY 8 HOURS PRN
Status: DISCONTINUED | OUTPATIENT
Start: 2024-01-28 | End: 2024-01-29

## 2024-01-28 RX ORDER — BISACODYL 10 MG
10 SUPPOSITORY, RECTAL RECTAL
Status: DISCONTINUED | OUTPATIENT
Start: 2024-01-28 | End: 2024-01-29

## 2024-01-28 RX ORDER — SENNOSIDES 8.6 MG
17.2 TABLET ORAL NIGHTLY PRN
Status: DISCONTINUED | OUTPATIENT
Start: 2024-01-28 | End: 2024-01-29

## 2024-01-28 NOTE — PROGRESS NOTES
01/28/24 1146   Clinical Encounter Type   Visited With Patient   Referral From Patient   Referral To    Jewish Encounters   Jewish Needs Prayer   Spiritual Requests During Visit / Hospitalization Alevism Communion   Sacramental Encounters   Communion Given Indicator Yes   Taxonomy   Intended Effects Aligning care plan with patient's values   Methods Offer emotional support;Offer spiritual/Buddhism support   Interventions [place];Share written prayer;Provde spiritual/Buddhism resources      initiated relationship, providing active listening as Evelyn shared of health challenges and hopeful that things will continue to improve.  She is aware that when a patient at [facility], she will have to ask for daily communion to receive it.  provided this service and added her to daily list.  She thanked him for visit and smiled.  Spiritual Care support can be requested via an Epic consult.

## 2024-01-28 NOTE — ED INITIAL ASSESSMENT (HPI)
Pt has indwelling urinary catheter and today around 1pm it stopped draining noted by the assisted living staff. Pt does feel pressure and bloating.

## 2024-01-28 NOTE — PLAN OF CARE
Received pt. From ER for urinary issues with zhao.  Pt. Had chronix zhao and was exchanged in the ER.  Draining yellow urine.  Deneis any pain.IVF's at 100/hr.  Pt. Was just discharged yesterday from hospital for pacemaker placement.V paced  on tele. Alert and oriented x4, but forgetful.  Very pleasant.  Had US of kidneys during the night, but no results as of yet.  Rested off and on.  Safety measures in place.  Family updated on POC.

## 2024-01-28 NOTE — CONSULTS
[facility]    Report of Consultation    Evelyn Reeves Patient Status:  Inpatient    1940 MRN [id number]   Location [facility] 3NE-A Attending Azael Forde MD   Hosp Day # 1 PCP Brendan Love MD       REASON FOR CONSULT:     LEONARD    HISTORY OF PRESENT ILLNESS:     82 yo F with recent admission for PPM placement,, diastolic CHF, HTN, dementia and chronic indwelling zhao presented with zhao not draining x 1 day associated with LEONARD with creatinine up to 2.86 mg/dl from baseline of 0.8-0.9 mg/dl. Zhao was exchanged and now draining well. Also on lasix and spironolactone for CHF. Started on IV fluids. Denies n/v/d. Eating well - just had [employer] for lunch. Denies NSAID use.    REVIEW OF SYSTEMS:     Please see HPI for pertinent positives. 10 point review of systems otherwise reviewed and negative.     HISTORY:     Past Medical History:   Diagnosis Date    Abdominal hernia     Abdominal pain     Anemia     Anxiety     Arthritis     Atypical mole     Back pain     Bad breath     Bloating     Blurred vision     Congestive heart disease (HCC)     Constipation     Depressed state     never required treatment    Depression     Diarrhea, unspecified     Easy bruising     Enlarged lymph node     Esophageal reflux     Flatulence/gas pain/belching     Frequent use of laxatives     Hearing impairment     Hearing loss     Hemorrhoids     High blood pressure     History of cardiac murmur     Hoarseness, chronic     per patient from Allergies    Incontinence     Indigestion     Irregular bowel habits     Migraine headache     Nausea     Night sweats     Osteoarthritis     Osteoporosis     Pain in joints     Pain with bowel movements     Painful urination     Presence of other cardiac implants and grafts     2 hip replacements    Stress     Uncomfortable fullness after meals     Urinary retention     Weight loss      Past Surgical History:   Procedure Laterality Date    BENIGN BIOPSY RIGHT       CATARACT      COLONOSCOPY N/A 04/08/2021    Procedure: colonoscopy;  Surgeon: Reagan Watt MD;  Location:  ENDOSCOPY    COLONOSCOPY      DRAIN/INJECT LARGE JOINT/BURSA  08/22/2013    Procedure: HIP INJECTION (PAIN);  Surgeon: Julián Suarez MD;  Location: [facility]    FLUOROSCOPIC GUIDANCE NEEDLE PLACEMENT  08/22/2013    Procedure: HIP INJECTION (PAIN);  Surgeon: Julián Suarez MD;  Location: [facility] PAIN MANAGEMENT    HIP REPLACEMENT SURGERY      bilateral   Dr turner  right 2015  left 2016     OTHER      R THR    OTHER SURGICAL HISTORY      hernia 1968    OTHER SURGICAL HISTORY      breast biopsy 1980    OTHER SURGICAL HISTORY  04/25/2014    flow [facility]- Dr. Ron    OTHER SURGICAL HISTORY  07/01/2019    cysto dr moreno    OTHER SURGICAL HISTORY  01/12/2022    cystoscopy-     PATIENT DOCUMENTED NOT TO HAVE EXPERIENCED ANY OF THE FOLLOWING EVENTS  08/22/2013    Procedure: HIP INJECTION (PAIN);  Surgeon: Julián Suarez MD;  Location: [facility] PAIN [facility]NA[facility]    PATIENT WITHOUGH PREOPERATIVE ORDER FOR IV ANTIBIOTIC SURGICAL SITE INFECTION PROPHYLAXIS.  08/22/2013    Procedure: HIP INJECTION (PAIN);  Surgeon: Julián Suarez MD;  Location: [facility] PAIN MANAGEMENT    TONSILLECTOMY      TOTAL HIP REPLACEMENT       Family History   Problem Relation Age of Onset    Cancer Father         prostate and throat    Heart Disorder Mother     Heart Attack Mother     Other (Parkinson's Dz) Mother 57      reports that she has never smoked. She has never been exposed to tobacco smoke. She has never used smokeless tobacco. She reports current alcohol use of about 5.0 standard drinks of alcohol per week. She reports that she does not use drugs.    ALLERGIES:     Allergies   Allergen Reactions    Adhesive Tape OTHER (SEE COMMENTS)     Welts    Sulfa Antibiotics UNKNOWN     \"Did not feel good.\" Daughters do not believe patient is allergic. Bactrim taken many times according to med  history.       MEDICATIONS:       Current Facility-Administered Medications:     ALPRAZolam (Xanax) tab 0.25 mg, 0.25 mg, Oral, Nightly PRN    sodium chloride 0.9% infusion, , Intravenous, Continuous    heparin (Porcine) 5000 UNIT/ML injection 5,000 Units, 5,000 Units, Subcutaneous, Q8H SHABANA    acetaminophen (Tylenol Extra Strength) tab 500 mg, 500 mg, Oral, Q4H PRN    polyethylene glycol (PEG 3350) (Miralax) 17 g oral packet 17 g, 17 g, Oral, Daily PRN    sennosides (Senokot) tab 17.2 mg, 17.2 mg, Oral, Nightly PRN    bisacodyl (Dulcolax) 10 MG rectal suppository 10 mg, 10 mg, Rectal, Daily PRN    ondansetron (Zofran) 4 MG/2ML injection 4 mg, 4 mg, Intravenous, Q6H PRN    metoclopramide (Reglan) 5 mg/mL injection 5 mg, 5 mg, Intravenous, Q8H PRN    influenza vaccine high dose quad (Fluzone QIV HD) 0.7 mL IM injection (ages >/= 65 years) 0.7 mL, 0.7 mL, Intramuscular, Prior to discharge    calcium carbonate (Tums) chewable tab 1,000 mg, 1,000 mg, Oral, Q6H PRN  No current outpatient medications on file.         PHYSICAL EXAM:     Vital Signs: /60 (BP Location: Right arm)   Pulse 73   Temp 98.1 °F (36.7 °C) (Oral)   Resp 18   Wt 135 lb (61.2 kg)   SpO2 95%   BMI 25.51 kg/m²   Temp (24hrs), Av.7 °F (36.5 °C), Min:97.1 °F (36.2 °C), Max:98.2 °F (36.8 °C)       Intake/Output Summary (Last 24 hours) at 2024 1556  Last data filed at 2024 0826  Gross per 24 hour   Intake 240 ml   Output 750 ml   Net -510 ml     Wt Readings from Last 3 Encounters:   24 135 lb (61.2 kg)   24 134 lb 14.7 oz (61.2 kg)   22 138 lb 9.6 oz (62.9 kg)       General: pleasant, well appearing  Skin: no visible rashes  HEENT: NCAT  Cardiac: Regular rate and rhythm, no murmur/gallop or rub  Lungs: CTAB, no wheeze, no rale, no rhonchi  Abdomen: Soft, NTND  Extremities: warm, well perfused, no leg edema  Neurologic/Psych: mentating well, no asterixis    LABORATORY DATA:       Lab Results   Component Value Date      (H) 01/28/2024    BUN 51 (H) 01/28/2024    BUNCREA 25.4 (H) 06/06/2021    CREATSERUM 1.73 (H) 01/28/2024    ANIONGAP 5 01/28/2024    GFRNAA 86 07/28/2022    GFRAA 99 07/28/2022    CA 8.6 01/28/2024    OSMOCALC 308 (H) 01/28/2024    ALKPHO 83 01/27/2024    AST 19 01/27/2024    ALT  01/27/2024      Comment:      Due to  backorder we are temporarily unable to offer hospital-based ALT testing at [facility].   If urgently needed, please order ALT test code [id number].   The new order will need a new venipuncture and will be sent to [facility] for testing.   The expected turnaround time will be within 24 hours.     BILT 0.4 01/27/2024    TP 6.6 01/27/2024    ALB 3.5 01/27/2024    GLOBULIN 3.1 01/27/2024    AGRATIO 2.4 05/31/2016     01/28/2024    K 3.9 01/28/2024     01/28/2024    CO2 26.0 01/28/2024     Lab Results   Component Value Date    WBC 6.4 01/28/2024    RBC 3.14 (L) 01/28/2024    HGB 10.5 (L) 01/28/2024    HCT 32.2 (L) 01/28/2024    .0 01/28/2024    .5 (H) 01/28/2024    MCH 33.4 01/28/2024    MCHC 32.6 01/28/2024    RDW 11.9 01/28/2024    NEPRELIM 3.77 01/28/2024    NEPERCENT 59.0 01/28/2024    LYPERCENT 25.4 01/28/2024    MOPERCENT 11.4 01/28/2024    EOPERCENT 3.1 01/28/2024    BAPERCENT 0.8 01/28/2024    NE 3.77 01/28/2024    LYMABS 1.62 01/28/2024    MOABSO 0.73 01/28/2024    EOABSO 0.20 01/28/2024    BAABSO 0.05 01/28/2024     Lab Results   Component Value Date    CREUR 130.00 01/27/2024     Lab Results   Component Value Date    COLORUR Yellow 01/27/2024    CLARITY Turbid (A) 01/27/2024    SPECGRAVITY 1.016 01/27/2024    GLUUR Normal 01/27/2024    BILUR Negative 01/27/2024    KETUR Negative 01/27/2024    BLOODURINE 1+ (A) 01/27/2024    PHURINE 5.0 01/27/2024    PROUR 30 (A) 01/27/2024    UROBILINOGEN Normal 01/27/2024    NITRITE Negative 01/27/2024    LEUUR 500 (A) 01/27/2024    WBCUR >50 (A) 01/27/2024    RBCUR 6-10 (A) 01/27/2024    EPIUR Few (A) 01/27/2024     BACUR Rare (A) 01/27/2024    HYLUR Present (A) 01/27/2024         IMAGING:     Reviewed.      ASSESSMENT/PLAN:   84 yo F with recent admission for PPM placement,, diastolic CHF, HTN, dementia and chronic indwelling zhao presented with zhao not draining x 1 day associated with LEONARD with creatinine up to 2.86 mg/dl from baseline of 0.8-0.9 mg/dl.    LEONARD: likely in the setting of obstruction from zhao not draining  -- trial off IV fluids; hold spironolactone and lasix until creatinine returns closer to baseline  -- avoid NSAIDs, IV contrast  -- I/Os    dCHF:  -- euvolemic on exam; hold further IV fluids    HTN:  -- BP is acceptable off meds    D/w patient's daughter at bedside.  Will follow.      Thank you for allowing me to participate in the care of your patient. Please do not hesitate to contact me with concerns or questions.    Gisele More MD  [facility]  [address] [place] [id number]    1/28/2024  3:55 PM

## 2024-01-28 NOTE — ED PROVIDER NOTES
Patient Seen in: [facility] Emergency Department      History     Chief Complaint   Patient presents with    Cath Tube Problem     Stated Complaint: zhao catheter problem, d/c'd last night after pacemaker placement, no urine ou*    Subjective:   HPI    Patient is an 83-year-old female presenting to the emergency department for evaluation of possible Zhao problems.  The patient's nursing home noted no urine output since around 1 PM.  Patient had been discharged from the hospital last night after having a pacemaker placed.  Family states that she only drank small amounts of liquid with her meals today.    Objective:   Past Medical History:   Diagnosis Date    Abdominal hernia     Abdominal pain     Anemia     Anxiety     Arthritis     Atypical mole     Back pain     Bad breath     Bloating     Blurred vision     Constipation     Depressed state     never required treatment    Diarrhea, unspecified     Easy bruising     Enlarged lymph node     Flatulence/gas pain/belching     Frequent use of laxatives     GERD (gastroesophageal reflux disease)     Dr. Valencia EGS adn colosncopy 2010    Hearing loss     Hemorrhoids     History of cardiac murmur     Hoarseness, chronic     per patient from Allergies    Indigestion     Irregular bowel habits     Migraine headache     Nausea     Night sweats     Osteoporosis     Pain in joints     Pain with bowel movements     Painful urination     Presence of other cardiac implants and grafts     2 hip replacements    Stress     Uncomfortable fullness after meals     Urinary retention     Weight loss               Past Surgical History:   Procedure Laterality Date    COLONOSCOPY N/A 4/8/2021    Procedure: colonoscopy;  Surgeon: Reagan Watt MD;  Location:  ENDOSCOPY    DRAIN/INJECT LARGE JOINT/BURSA  8/22/2013    Procedure: HIP INJECTION (PAIN);  Surgeon: Julián Suarez MD;  Location: [facility]    FLUOROSCOPIC GUIDANCE NEEDLE PLACEMENT  8/22/2013     Procedure: HIP INJECTION (PAIN);  Surgeon: Julián Suarez MD;  Location: [facility] PAIN MANAGEMENT    HIP REPLACEMENT SURGERY      bilateral   Dr turner  right 2015  left 2016     OTHER      R THR    OTHER SURGICAL HISTORY      hernia 1968    OTHER SURGICAL HISTORY      breast biopsy 1980    OTHER SURGICAL HISTORY  04/25/2014    flow us- Dr. Ron    OTHER SURGICAL HISTORY  07/01/2019    cysto dr moreno    OTHER SURGICAL HISTORY  01/12/2022    cystoscopy-     PATIENT DOCUMENTED NOT TO HAVE EXPERIENCED ANY OF THE FOLLOWING EVENTS  8/22/2013    Procedure: HIP INJECTION (PAIN);  Surgeon: Julián Suarez MD;  Location: [facility] PAIN MANAGEMENT    PATIENT WITHOUGH PREOPERATIVE ORDER FOR IV ANTIBIOTIC SURGICAL SITE INFECTION PROPHYLAXIS.  8/22/2013    Procedure: HIP INJECTION (PAIN);  Surgeon: Julián Suarez MD;  Location: [facility] PAIN MANAGEMENT    TONSILLECTOMY                  Social History     Socioeconomic History    Marital status:     Number of children: 5   Occupational History    Occupation: homemaker   Tobacco Use    Smoking status: Never    Smokeless tobacco: Never   Vaping Use    Vaping Use: Never used   Substance and Sexual Activity    Alcohol use: Yes     Alcohol/week: 5.0 standard drinks of alcohol     Types: 3 Glasses of wine, 2 Shots of liquor per week    Drug use: No    Sexual activity: Not Currently     Partners: Male     Social Determinants of Health     Food Insecurity: No Food Insecurity (1/23/2024)    Food Insecurity     Food Insecurity: Never true   Transportation Needs: No Transportation Needs (1/23/2024)    Transportation Needs     Lack of Transportation: No   Housing Stability: Low Risk  (1/23/2024)    Housing Stability     Housing Instability: No              Review of Systems    Positive for stated complaint: zhao catheter problem, d/c'd last night after pacemaker placement, no urine ou*  Other systems are as noted in HPI.  Constitutional and vital signs  reviewed.      All other systems reviewed and negative except as noted above.    Physical Exam     ED Triage Vitals [01/27/24 1857]   BP 99/59   Pulse 76   Resp 18   Temp 97.1 °F (36.2 °C)   Temp src Temporal   SpO2 97 %   O2 Device None (Room air)       Current:/52   Pulse 68   Temp 97.1 °F (36.2 °C) (Temporal)   Resp 18   Wt 61.2 kg   SpO2 100%   BMI 25.49 kg/m²         Physical Exam    Constitutional: No apparent distress  Eyes: No scleral icterus  Heart: regular rate rhythm, no murmurs  Lungs: Clear to auscultation bilaterally  Abdomen: Soft and nontender  Skin: No rash  Neuro: Alert and oriented ×3          ED Course/ My interpretations:     Labs Reviewed   URINALYSIS, ROUTINE - Abnormal; Notable for the following components:       Result Value    Clarity Urine Cloudy (*)     Ketones Urine Trace (*)     Blood Urine Moderate (*)     Protein Urine 100 mg/dL (*)     Leukocyte Esterase Urine Large (*)     WBC Urine 11-20 (*)     All other components within normal limits   COMP METABOLIC PANEL (14) - Abnormal; Notable for the following components:    Glucose 107 (*)     BUN 61 (*)     Creatinine 2.86 (*)     Calculated Osmolality 300 (*)     eGFR-Cr 16 (*)     All other components within normal limits   UA MICROSCOPIC ONLY, URINE - Abnormal; Notable for the following components:    WBC Urine 11-20 (*)     All other components within normal limits   CBC W/ DIFFERENTIAL - Abnormal; Notable for the following components:    RBC 3.45 (*)     HGB 11.7 (*)     HCT 34.7 (*)     .6 (*)     All other components within normal limits   CBC WITH DIFFERENTIAL WITH PLATELET    Narrative:     The following orders were created for panel order CBC With Differential With Platelet.  Procedure                               Abnormality         Status                     ---------                               -----------         ------                     CBC W/ DIFFERENTIAL[[id number]]          Abnormal             Final result                 Please view results for these tests on the individual orders.   RAPID SARS-COV-2 BY PCR         My independent imaging interpretation:      Procedures    Urinalysis, Routine    Comp Metabolic Panel (14)    CBC With Differential With Platelet    UA Microscopic only, urine        All available radiology reports for this visit reviewed.      Medications given:  Orders Placed This Encounter    Urinalysis, Routine    Comp Metabolic Panel (14)    CBC With Differential With Platelet    UA Microscopic only, urine    FOLLOWED BY Linked Order Group     sodium chloride 0.9 % IV bolus 500 mL     sodium chloride 0.9% infusion        Fresh Cooper catheter was placed small amount of cloudy light yellow room urine drained from the bladder.    No signs of urinary retention.         MDM      Extensive differential diagnosis was considered for the patient including Cooper catheter obstruction, oliguria, renal failure, dehydration and other urologic, nephrology and other pathology.    Patient noted to be somewhat dehydrated clinically.  IV rehydration initiated.    Creatinine noted to be 2.98.    Patient admitted to hospital for further evaluation and treatment.  Hospitalist and nephrology consulted.      Admission disposition: 1/27/2024 10:12 PM         After 500 mill normal saline bolus 100 ml of urine production observed in the ED.      Disposition and Plan     Clinical Impression:  1. Acute renal failure superimposed on chronic kidney disease, unspecified acute renal failure type, unspecified CKD stage  (HCC)         Disposition:  Admit  1/27/2024 10:12 pm    Follow-up:  No follow-up provider specified.        Medications Prescribed:  Current Discharge Medication List                            Hospital Problems       Present on Admission  Date Reviewed: 7/28/2022            ICD-10-CM Noted POA    * (Principal) Acute renal failure superimposed on chronic kidney disease, unspecified acute renal failure  type, unspecified CKD stage  (HCC) N17.9, N18.9 1/27/2024 Unknown           Documentation created with the aid of Dragon voice recognition software.  Although efforts were made to ensure the accuracy of the note, some inaccuracies may persist.

## 2024-01-28 NOTE — H&P
[facility]    History and Physical     Evelyn Reeves Patient Status:  Inpatient    1940 MRN [id number]   Location [facility] 3NE-A Attending Azael Forde MD   Hosp Day # 1 PCP Brendan Love MD     Chief Complaint:   Chief Complaint   Patient presents with    Cath Tube Problem         History of Present Illness: Evelyn Reeves is a 83 year old female with CHF, chronic indwelling Cooper, secondary AV block s/p PPM placement, discharged home yesterday who presented to the ED from her nursing home for evaluation of lack of urine output.  Patient is a poor historian but denies complaints, patient's family reportedly said that her oral intake has been decreased.    On arrival to the ED she was afebrile and hemodynamically stable, saturating 97% on room air.  Labs were notable for creatinine 2.9, otherwise unremarkable from day of discharge.  Her Cooper catheter was exchanged with \"a small amount of cloudy light yellow urine drained from the bladder.\" UA 11-20 WBCs, negative nitrtites, no bacteria. She was started on IV fluids and nephrology was consulted.    Overnight following mission there are no acute events and she remained afebrile and hemodynamically stable.  This morning her creatinine has improved to 1.7 and she denies any new or worsening symptoms.     Past Medical History:  Past Medical History:   Diagnosis Date    Abdominal hernia     Abdominal pain     Anemia     Anxiety     Arthritis     Atypical mole     Back pain     Bad breath     Bloating     Blurred vision     Congestive heart disease (HCC)     Constipation     Depressed state     never required treatment    Depression     Diarrhea, unspecified     Easy bruising     Enlarged lymph node     Esophageal reflux     Flatulence/gas pain/belching     Frequent use of laxatives     Hearing impairment     Hearing loss     Hemorrhoids     High blood pressure     History of cardiac murmur     Hoarseness, chronic     per patient  from Allergies    Incontinence     Indigestion     Irregular bowel habits     Migraine headache     Nausea     Night sweats     Osteoarthritis     Osteoporosis     Pain in joints     Pain with bowel movements     Painful urination     Presence of other cardiac implants and grafts     2 hip replacements    Stress     Uncomfortable fullness after meals     Urinary retention     Weight loss         Past Surgical History:   Past Surgical History:   Procedure Laterality Date    BENIGN BIOPSY RIGHT      CATARACT      COLONOSCOPY N/A 04/08/2021    Procedure: colonoscopy;  Surgeon: Reagan Watt MD;  Location:  ENDOSCOPY    COLONOSCOPY      DRAIN/INJECT LARGE JOINT/BURSA  08/22/2013    Procedure: HIP INJECTION (PAIN);  Surgeon: Julián Suarez MD;  Location: [facility] PAIN MANAGEMENT    FLUOROSCOPIC GUIDANCE NEEDLE PLACEMENT  08/22/2013    Procedure: HIP INJECTION (PAIN);  Surgeon: Julián Suarez MD;  Location: [facility] PAIN MANAGEMENT    HIP REPLACEMENT SURGERY      bilateral   Dr turner  right 2015  left 2016     OTHER      R THR    OTHER SURGICAL HISTORY      hernia 1968    OTHER SURGICAL HISTORY      breast biopsy 1980    OTHER SURGICAL HISTORY  04/25/2014    flow [facility]- Dr. Ron    OTHER SURGICAL HISTORY  07/01/2019    cysto dr moreno    OTHER SURGICAL HISTORY  01/12/2022    cystoscopy-     PATIENT DOCUMENTED NOT TO HAVE EXPERIENCED ANY OF THE FOLLOWING EVENTS  08/22/2013    Procedure: HIP INJECTION (PAIN);  Surgeon: Julián Suarez MD;  Location: [facility]    PATIENT WITHOUGH PREOPERATIVE ORDER FOR IV ANTIBIOTIC SURGICAL SITE INFECTION PROPHYLAXIS.  08/22/2013    Procedure: HIP INJECTION (PAIN);  Surgeon: Julián Suarez MD;  Location: [facility] PAIN MANAGEMENT    TONSILLECTOMY      TOTAL HIP REPLACEMENT         Social History:  reports that she has never smoked. She has never been exposed to tobacco smoke. She has never used smokeless tobacco. She reports current  alcohol use of about 5.0 standard drinks of alcohol per week. She reports that she does not use drugs.    Family History:   Family History   Problem Relation Age of Onset    Cancer Father         prostate and throat    Heart Disorder Mother     Heart Attack Mother     Other (Parkinson's Dz) Mother 57       Allergies:   Allergies   Allergen Reactions    Adhesive Tape OTHER (SEE COMMENTS)     Welts    Sulfa Antibiotics UNKNOWN     \"Did not feel good.\" Daughters do not believe patient is allergic. Bactrim taken many times according to med history.       Medications:    Current Facility-Administered Medications on File Prior to Encounter   Medication Dose Route Frequency Provider Last Rate Last Admin    [COMPLETED] lidocaine PF (Xylocaine-MPF) 1 % injection             [COMPLETED] fentaNYL (Sublimaze) 50 mcg/mL injection             [COMPLETED] midazolam (Versed) 2 MG/2ML injection             [COMPLETED] vancomycin (Vancocin) 1 g injection             [COMPLETED] ceFAZolin (Ancef) 2 g/20mL IV syringe premix             [COMPLETED] diphenhydrAMINE (Benadryl) 50 mg/mL  injection             [COMPLETED] ceFAZolin (Ancef) 2 g in 20mL IV syringe premix  2 g Intravenous Q8H Tyree Carlton MD   2 g at 24 0906    [COMPLETED] chlorhexidine (Hibiclens) 4 % external liquid 30 mL  30 mL Topical On Call Genet Farr APRN   30 mL at 24 0630    [] sodium chloride 0.9% infusion   Intravenous On Call Genet Farr APRN        [COMPLETED] ceFAZolin (Ancef) 2 g in 20mL IV syringe premix  2 g Intravenous 30 Min Pre-Op Genet Farr APRN   2 g at 24 1715     Current Outpatient Medications on File Prior to Encounter   Medication Sig Dispense Refill    furosemide 40 MG Oral Tab Take 1 tablet (40 mg total) by mouth daily.      methenamine 1 g Oral Tab Take 1 tablet (1 g total) by mouth 2 (two) times daily.      spironolactone 25 MG Oral Tab Take 0.5 tablets (12.5 mg total) by mouth daily.      calcium  carbonate 500 MG Oral Chew Tab Chew 1 tablet (500 mg total) by mouth daily.      magnesium hydroxide (MILK OF MAGNESIA) 400 MG/5ML Oral Suspension Take 30 mL by mouth daily as needed for constipation.      ALPRAZolam 0.25 MG Oral Tab Take 1 tablet (0.25 mg total) by mouth nightly as needed for Anxiety.      lisinopril 40 MG Oral Tab Take 1 tablet (40 mg total) by mouth daily.      acetaminophen 325 MG Oral Tab Take 2 tablets (650 mg total) by mouth every 6 (six) hours as needed for Pain.      senna-docusate 8.6-50 MG Oral Tab Take 2 tablets by mouth 2 (two) times daily as needed for constipation.         Review of Systems:   A comprehensive 14 point review of systems was completed.    Pertinent positives and negatives noted in the HPI.    Physical Exam:    /78 (BP Location: Right arm)   Pulse 73   Temp 97.3 °F (36.3 °C) (Oral)   Resp 18   Wt 135 lb (61.2 kg)   SpO2 96%   BMI 25.51 kg/m²     General: No acute distress. Comfortable, nontoxic   HEENT: Normocephalic atraumatic. Moist mucous membranes; Sclera anicteric.  Neck: Supple, no JVD  Respiratory: Clear to auscultation bilaterally. Reg resp rate & effort, no wheezes/crackles   Cardiovascular: S1, S2. Regular rate and rhythm. No murmurs appreciable   Chest and Back: No tenderness or deformity.  Abdomen: Soft, nontender, nondistended.  Positive bowel sounds. No rebound, guarding or organomegaly.  Neurologic: No focal neurological deficits. CNII-XII grossly intact.  Musculoskeletal: Moves all extremities.  Extremities: No edema or cyanosis.  Skin/Lines: No rashes or lesions. IFC draining clear yellow urine   Psychiatric: Appropriate mood and affect.      Diagnostic Data:      Labs:  Recent Labs   Lab 01/23/24  1539 01/24/24  0542 01/25/24  0514 01/26/24  0453 01/27/24  2135 01/28/24  0631   WBC 6.7 5.5 5.6 8.2 9.0 6.4   HGB 12.2 10.8* 11.5* 12.0 11.7* 10.5*   MCV 99.5 99.7 100.0 98.3 100.6* 102.5*   .0 213.0 211.0 214.0 200.0 161.0   INR 0.96  --    --   --   --   --        Recent Labs   Lab 01/23/24  1539 01/24/24  0542 01/26/24  0453 01/27/24  2135 01/28/24  0633   GLU 93   < > 106* 107* 108*   BUN 30*   < > 32* 61* 51*   CREATSERUM 1.12*   < > 0.88 2.86* 1.73*   CA 9.2   < > 9.6 9.2 8.6   ALB 3.8  --   --  3.5  --       < > 140 136 142   K 3.7   < > 3.8 4.2 3.9      < > 109 103 111   CO2 30.0   < > 28.0 29.0 26.0   ALKPHO 87  --   --  83  --    AST 18  --   --  19  --    BILT 0.3  --   --  0.4  --    TP 7.0  --   --  6.6  --     < > = values in this interval not displayed.       Estimated Creatinine Clearance: 18.9 mL/min (A) (based on SCr of 1.73 mg/dL (H)).    Recent Labs   Lab 01/23/24  1539   PTP 12.7   INR 0.96       Recent Labs   Lab 01/27/24  2135          Imaging: Imaging data reviewed in Epic.      ASSESSMENT / PLAN:     Evelyn Reeves Is a a 83 year old female who presents with LEONARD and indwelling catheter malfucntion    Problem List / Diagnoses    LEONARD  Chronic Indwelling Cooper Catheter  Abnormal UA  Anxiety  HTN     Plan    LEONARD  -- Cr 2.9 on admission, baseline normal   -- likely secondary to malfunctioning IFC however they may be a component of dehydration   -- Renal US unremarkable   -- improving to 1.7 following fluids continue  -- strict I/Os  -- renally dose meds, avoid nephrotoxic agents   -- nephrology consulted, will f/u recommendations     Chronic Indwelling Cooper Catheter  Abnormal UA  -- UA not suggestive of UTI, no fevers, no leukocytosis, Cr improving with ivf  -- defer antibiotics at this time, CT, will follow urine cultures   -- hold home methenamine for now given LEONARD     Anxiety  -- resume home alprazolam prn    HTN  -- holding lisinopril/furosemide/spironolactone given LEONARD   -- BP currently well controlled     DVT Mechanical Prophylaxis:   SCDs,    DVT Pharmacologic Prophylaxis   Medication    heparin (Porcine) 5000 UNIT/ML injection 5,000 Units              Code Status: DNAR/Selective Treatment    Dispo:  inpatient; [place] 1-2 days pending continued clinical improvement, nephrology eval     Plan of care discussed with patient and/or family at bedside.    N Tyree Forde MD  [facility]   [phone]

## 2024-01-28 NOTE — ED QUICK NOTES
Orders for admission, patient is aware of plan and ready to go upstairs. Any questions, please call ED RN Radha at extension [phone].     Patient Covid vaccination status: Unvaccinated     COVID Test Ordered in ED: None    COVID Suspicion at Admission: N/A    Running Infusions:    sodium chloride          Mental Status/LOC at time of transport: AO3-4, hx of dementia     Other pertinent information:   CIWA score: N/A   NIH score:  N/A

## 2024-01-28 NOTE — PLAN OF CARE
Assumed pt care this morning at 0730.   Pt is A&OX4. Pt states she is tired, wasn't able to sleep overnight.   RA, VSS  Tele: Vpaced  Denies having pain.  R AC IV infusing 0.9NS at 100ml.   Cooper in place, yellow output.   Bed in lowest position, call light within reach, bed alarm on.   Daughter present this morning.     Problem: PAIN - ADULT  Goal: Verbalizes/displays adequate comfort level or patient's stated pain goal  Description: INTERVENTIONS:  - Encourage pt to monitor pain and request assistance  - Assess pain using appropriate pain scale  - Administer analgesics based on type and severity of pain and evaluate response  - Implement non-pharmacological measures as appropriate and evaluate response  - Consider cultural and social influences on pain and pain management  - Manage/alleviate anxiety  - Utilize distraction and/or relaxation techniques  - Monitor for opioid side effects  - Notify MD/LIP if interventions unsuccessful or patient reports new pain  - Anticipate increased pain with activity and pre-medicate as appropriate  Outcome: Progressing     Problem: RISK FOR INFECTION - ADULT  Goal: Absence of fever/infection during anticipated neutropenic period  Description: INTERVENTIONS  - Monitor WBC  - Administer growth factors as ordered  - Implement neutropenic guidelines  Outcome: Progressing     Problem: GENITOURINARY - ADULT  Goal: Absence of urinary retention  Description: INTERVENTIONS:  - Assess patient’s ability to void and empty bladder  - Monitor intake/output and perform bladder scan as needed  - Follow urinary retention protocol/standard of care  - Consider collaborating with pharmacy to review patient's medication profile  - Implement strategies to promote bladder emptying  Outcome: Progressing

## 2024-01-29 VITALS
RESPIRATION RATE: 18 BRPM | OXYGEN SATURATION: 95 % | DIASTOLIC BLOOD PRESSURE: 57 MMHG | SYSTOLIC BLOOD PRESSURE: 164 MMHG | HEART RATE: 77 BPM | WEIGHT: 135 LBS | TEMPERATURE: 98 F | BODY MASS INDEX: 26 KG/M2

## 2024-01-29 LAB
ANION GAP SERPL CALC-SCNC: 3 MMOL/L (ref 0–18)
BASOPHILS # BLD AUTO: 0.04 X10(3) UL (ref 0–0.2)
BASOPHILS NFR BLD AUTO: 0.8 %
BUN BLD-MCNC: 23 MG/DL (ref 9–23)
CALCIUM BLD-MCNC: 8.8 MG/DL (ref 8.5–10.1)
CHLORIDE SERPL-SCNC: 115 MMOL/L (ref 98–112)
CO2 SERPL-SCNC: 26 MMOL/L (ref 21–32)
CREAT BLD-MCNC: 0.64 MG/DL
EGFRCR SERPLBLD CKD-EPI 2021: 88 ML/MIN/1.73M2 (ref 60–?)
EOSINOPHIL # BLD AUTO: 0.17 X10(3) UL (ref 0–0.7)
EOSINOPHIL NFR BLD AUTO: 3.6 %
ERYTHROCYTE [DISTWIDTH] IN BLOOD BY AUTOMATED COUNT: 11.8 %
GLUCOSE BLD-MCNC: 112 MG/DL (ref 70–99)
HCT VFR BLD AUTO: 32.7 %
HGB BLD-MCNC: 10.9 G/DL
IMM GRANULOCYTES # BLD AUTO: 0.02 X10(3) UL (ref 0–1)
IMM GRANULOCYTES NFR BLD: 0.4 %
LYMPHOCYTES # BLD AUTO: 1.16 X10(3) UL (ref 1–4)
LYMPHOCYTES NFR BLD AUTO: 24.5 %
MCH RBC QN AUTO: 33.7 PG (ref 26–34)
MCHC RBC AUTO-ENTMCNC: 33.3 G/DL (ref 31–37)
MCV RBC AUTO: 101.2 FL
MONOCYTES # BLD AUTO: 0.45 X10(3) UL (ref 0.1–1)
MONOCYTES NFR BLD AUTO: 9.5 %
NEUTROPHILS # BLD AUTO: 2.89 X10 (3) UL (ref 1.5–7.7)
NEUTROPHILS # BLD AUTO: 2.89 X10(3) UL (ref 1.5–7.7)
NEUTROPHILS NFR BLD AUTO: 61.2 %
OSMOLALITY SERPL CALC.SUM OF ELEC: 302 MOSM/KG (ref 275–295)
PLATELET # BLD AUTO: 168 10(3)UL (ref 150–450)
POTASSIUM SERPL-SCNC: 4.3 MMOL/L (ref 3.5–5.1)
RBC # BLD AUTO: 3.23 X10(6)UL
SODIUM SERPL-SCNC: 144 MMOL/L (ref 136–145)
WBC # BLD AUTO: 4.7 X10(3) UL (ref 4–11)

## 2024-01-29 PROCEDURE — 80048 BASIC METABOLIC PNL TOTAL CA: CPT | Performed by: HOSPITALIST

## 2024-01-29 PROCEDURE — 85025 COMPLETE CBC W/AUTO DIFF WBC: CPT | Performed by: HOSPITALIST

## 2024-01-29 RX ORDER — FUROSEMIDE 40 MG/1
20 TABLET ORAL DAILY
Status: SHIPPED | COMMUNITY
Start: 2024-01-29

## 2024-01-29 RX ORDER — LISINOPRIL 40 MG/1
40 TABLET ORAL DAILY
Status: DISCONTINUED | OUTPATIENT
Start: 2024-01-29 | End: 2024-01-29

## 2024-01-29 NOTE — DISCHARGE INSTRUCTIONS
Reduce lasix to 20 mg daily.   Continue rest of home medications  Repeat BMP (to check kidney function) later this week

## 2024-01-29 NOTE — PLAN OF CARE
Alert x4. Room air. V paced on tele. Chronic zhao in place draining well. Up 1 assist w/ walker. Updated on POC, all questions and concerns addressed and answered to pt satisfaction. Call light within reach.     Problem: Patient/Family Goals  Goal: Patient/Family Long Term Goal  Description: Patient's Long Term Goal:   Discharge with appropriate resources    Interventions:  - comply with POC  - See additional Care Plan goals for specific interventions  1/28/2024 2301 by Elza Zapata RN  Outcome: Progressing  1/28/2024 2249 by Elza Zapata RN  Outcome: Progressing  1/28/2024 2248 by Elza Zapata RN  Outcome: Progressing  Goal: Patient/Family Short Term Goal  Description: Patient's Short Term Goal:   1/28 noc: \"sleep tonight\"    Interventions:   - cluster care  -prn xanax  - See additional Care Plan goals for specific interventions  1/28/2024 2301 by Elza Zapata RN  Outcome: Progressing  1/28/2024 2249 by Elza Zapata RN  Outcome: Progressing  1/28/2024 2248 by Elza Zapata RN  Outcome: Progressing     Problem: PAIN - ADULT  Goal: Verbalizes/displays adequate comfort level or patient's stated pain goal  Description: INTERVENTIONS:  - Encourage pt to monitor pain and request assistance  - Assess pain using appropriate pain scale  - Administer analgesics based on type and severity of pain and evaluate response  - Implement non-pharmacological measures as appropriate and evaluate response  - Consider cultural and social influences on pain and pain management  - Manage/alleviate anxiety  - Utilize distraction and/or relaxation techniques  - Monitor for opioid side effects  - Notify MD/LIP if interventions unsuccessful or patient reports new pain  - Anticipate increased pain with activity and pre-medicate as appropriate  1/28/2024 2301 by Elza Zapata RN  Outcome: Progressing  1/28/2024 2249 by Elza Zapata RN  Outcome: Progressing  1/28/2024 2248 by Elza Zapata RN  Outcome: Progressing      Problem: RISK FOR INFECTION - ADULT  Goal: Absence of fever/infection during anticipated neutropenic period  Description: INTERVENTIONS  - Monitor WBC  - Administer growth factors as ordered  - Implement neutropenic guidelines  1/28/2024 2301 by Elza Zapata RN  Outcome: Progressing  1/28/2024 2249 by Elza Zapata RN  Outcome: Progressing  1/28/2024 2248 by Elza Zapata RN  Outcome: Progressing     Problem: GENITOURINARY - ADULT  Goal: Absence of urinary retention  Description: INTERVENTIONS:  - Assess patient’s ability to void and empty bladder  - Monitor intake/output and perform bladder scan as needed  - Follow urinary retention protocol/standard of care  - Consider collaborating with pharmacy to review patient's medication profile  - Implement strategies to promote bladder emptying  1/28/2024 2301 by Elza Zapata RN  Outcome: Progressing  1/28/2024 2249 by Elza Zapata RN  Outcome: Progressing  1/28/2024 2248 by Elza Zapata RN  Outcome: Progressing

## 2024-01-29 NOTE — CM/SW NOTE
01/29/24 1500   CM/SW Referral Data   Referral Source Social Work (self-referral)   Reason for Referral Discharge planning   Informant Clinical Staff Member;EMR   Patient Info   Patient's Home Environment Assisted Living   Post Acute Care Provider Upon Admission   ([facility])     Pt is a 82 y/o female admitted with acute renal failure. Notified by RN pt is anticipated to discharge today. Chart reviewed and pt is a resident at [facility].     MAX called [facility] [phone] and spoke with Tamanna. Tamanna stated she spoke with her RNC and pt is okay to return today. Tamanna requested clinical updates to be faxed to her at [phone] and for RN to call report. MAX updated RN. Clinicals faxed to Tamanna at [phone].    [facility]  [address], [place] [address]  [phone]  Fax: [phone]    SOHAIL Proctor  Discharge Planner

## 2024-01-29 NOTE — PROGRESS NOTES
[facility] Nephrology  Inpatient Follow-up    Evelyn Reeves Patient Status:  Inpatient    1940 MRN [id number]   Location [address] Attending Kvng Plummer,    Hosp Day # 2 PCP Brendan Love MD       SUBJECTIVE:     Patient seen and examined at bedside. No acute complaints today.     MEDICATIONS:     Current Facility-Administered Medications   Medication Dose Route Frequency    lisinopril (Prinivil; Zestril) tab 40 mg  40 mg Oral Daily    ALPRAZolam (Xanax) tab 0.25 mg  0.25 mg Oral Nightly PRN    heparin (Porcine) 5000 UNIT/ML injection 5,000 Units  5,000 Units Subcutaneous Q8H SHABANA    acetaminophen (Tylenol Extra Strength) tab 500 mg  500 mg Oral Q4H PRN    polyethylene glycol (PEG 3350) (Miralax) 17 g oral packet 17 g  17 g Oral Daily PRN    sennosides (Senokot) tab 17.2 mg  17.2 mg Oral Nightly PRN    bisacodyl (Dulcolax) 10 MG rectal suppository 10 mg  10 mg Rectal Daily PRN    ondansetron (Zofran) 4 MG/2ML injection 4 mg  4 mg Intravenous Q6H PRN    metoclopramide (Reglan) 5 mg/mL injection 5 mg  5 mg Intravenous Q8H PRN    influenza vaccine high dose quad (Fluzone QIV HD) 0.7 mL IM injection (ages >/= 65 years) 0.7 mL  0.7 mL Intramuscular Prior to discharge    calcium carbonate (Tums) chewable tab 1,000 mg  1,000 mg Oral Q6H PRN       PHYSICAL EXAM:     Vital Signs: BP (!) 164/57 (BP Location: Right arm)   Pulse 77   Temp 98.2 °F (36.8 °C) (Oral)   Resp 18   Wt 135 lb (61.2 kg)   SpO2 100%   BMI 25.51 kg/m²   Temp (24hrs), Av.9 °F (36.6 °C), Min:97.3 °F (36.3 °C), Max:98.5 °F (36.9 °C)       Intake/Output Summary (Last 24 hours) at 2024 1420  Last data filed at 2024 1330  Gross per 24 hour   Intake 980 ml   Output 1480 ml   Net -500 ml     Wt Readings from Last 3 Encounters:   24 135 lb (61.2 kg)   24 134 lb 14.7 oz (61.2 kg)   22 138 lb 9.6 oz (62.9 kg)       General: no acute distress  HEENT: normocephalic, atraumatic  CV:  RRR  Respiratory: no distress  Abdomen: soft, non-tender  Extremities: no edema bilaterally  Skin: no rashes on visible skin  Neuro: alert and oriented x3, no focal deficits     LABORATORY DATA:     Lab Results   Component Value Date     (H) 01/29/2024    BUN 23 01/29/2024    BUNCREA 25.4 (H) 06/06/2021    CREATSERUM 0.64 01/29/2024    ANIONGAP 3 01/29/2024    GFRNAA 86 07/28/2022    GFRAA 99 07/28/2022    CA 8.8 01/29/2024    OSMOCALC 302 (H) 01/29/2024    ALKPHO 83 01/27/2024    AST 19 01/27/2024    ALT  01/27/2024      Comment:      Due to  backorder we are temporarily unable to offer hospital-based ALT testing at [facility].   If urgently needed, please order ALT test code [id number].   The new order will need a new venipuncture and will be sent to [facility] for testing.   The expected turnaround time will be within 24 hours.     BILT 0.4 01/27/2024    TP 6.6 01/27/2024    ALB 3.5 01/27/2024    GLOBULIN 3.1 01/27/2024    AGRATIO 2.4 05/31/2016     01/29/2024    K 4.3 01/29/2024     (H) 01/29/2024    CO2 26.0 01/29/2024     Lab Results   Component Value Date    WBC 4.7 01/29/2024    RBC 3.23 (L) 01/29/2024    HGB 10.9 (L) 01/29/2024    HCT 32.7 (L) 01/29/2024    .0 01/29/2024    .2 (H) 01/29/2024    MCH 33.7 01/29/2024    MCHC 33.3 01/29/2024    RDW 11.8 01/29/2024    NEPRELIM 2.89 01/29/2024    NEPERCENT 61.2 01/29/2024    LYPERCENT 24.5 01/29/2024    MOPERCENT 9.5 01/29/2024    EOPERCENT 3.6 01/29/2024    BAPERCENT 0.8 01/29/2024    NE 2.89 01/29/2024    LYMABS 1.16 01/29/2024    MOABSO 0.45 01/29/2024    EOABSO 0.17 01/29/2024    BAABSO 0.04 01/29/2024     Lab Results   Component Value Date    CREUR 130.00 01/27/2024     Lab Results   Component Value Date    COLORUR Yellow 01/27/2024    CLARITY Turbid (A) 01/27/2024    SPECGRAVITY 1.016 01/27/2024    GLUUR Normal 01/27/2024    BILUR Negative 01/27/2024    KETUR Negative 01/27/2024    BLOODURINE 1+ (A) 01/27/2024     PHURINE 5.0 01/27/2024    PROUR 30 (A) 01/27/2024    UROBILINOGEN Normal 01/27/2024    NITRITE Negative 01/27/2024    LEUUR 500 (A) 01/27/2024    WBCUR >50 (A) 01/27/2024    RBCUR 6-10 (A) 01/27/2024    EPIUR Few (A) 01/27/2024    BACUR Rare (A) 01/27/2024    HYLUR Present (A) 01/27/2024       IMAGING:     Reviewed    ASSESSMENT/PLAN:     82 yo F with recent admission for PPM placement,, diastolic CHF, HTN, dementia and chronic indwelling zhao presented with zhao not draining x 1 day associated with LEONARD with creatinine up to 2.86 mg/dl from baseline of 0.8-0.9 mg/dl.     LEONARD: likely in the setting of obstruction from zhao not draining  -- okay to resume home medications  -- avoid NSAIDs, IV contrast  -- I/Os     dCHF:  -- euvolemic on exam     HTN:  -- BP is acceptable       Okay for discharge from nephrology perspective.    Thank you for allowing us to participate in the care of this patient.       John Persaud DO   [facility] - Nephrology

## 2024-01-29 NOTE — DISCHARGE SUMMARY
[facility] Discharge Summary    Patient ID  Evelyn Reeves  [id number]  83 year old  2/25/1940    Admit date: 1/27/2024    Discharge date: 01/29/24    Attending: Kvng Plummer DO     Primary Care Physician: Brendan Love MD      Reason for admission:sinan    Discharge condition: stable    Disposition: home    Important follow up:  -PCP within 7 d  -specialists:      -labs:    -radiology:      Additional patient instructions    Reduce lasix to 20 mg daily.   Continue rest of home medications  Repeat BMP (to check kidney function) later this week     Discharge med list     Medication List        ASK your doctor about these medications      acetaminophen 325 MG Tabs  Commonly known as: Tylenol     ALPRAZolam 0.25 MG Tabs  Commonly known as: Xanax     calcium carbonate 500 MG Chew  Commonly known as: Tums     furosemide 40 MG Tabs  Commonly known as: Lasix     lisinopril 40 MG Tabs  Commonly known as: Prinivil; Zestril     methenamine 1 g Tabs  Commonly known as: Hiprex     Milk of Magnesia 400 MG/5ML Susp  Generic drug: magnesium hydroxide     senna-docusate 8.6-50 MG Tabs  Commonly known as: Senokot-S     spironolactone 25 MG Tabs  Commonly known as: Aldactone              Discharge Diagnoses:    SINAN  Chronic Indwelling Cooper Catheter  Abnormal UA  Anxiety  HTN     Consults:  IP CONSULT TO NEPHROLOGY  IP CONSULT TO NEPHROLOGY    Radiology:  US KIDNEY/BLADDER (CPT=76770)    Result Date: 1/28/2024  PROCEDURE:  US KIDNEY/BLADDER (CPT=76770)  COMPARISON:  [facility],  ABD COMPL W-O DOPPLER, 4/20/2005, 9:17 AM.  INDICATIONS:  Decreased urine output/oliguria.  TECHNIQUE:  Transabdominal gray scale ultrasound imaging of the bilateral kidneys and bladder was performed.  Routine technique was utilized.   PATIENT STATED HISTORY: (As transcribed by Technologist)  Patient states she had pacemaker surgery 01/26/2024. Patient has a Cooper and she states she had little urine output. Acute renal failure and  chronic kidney disease.    FINDINGS:   RIGHT KIDNEY MEASUREMENTS:  10.6 x 5.8 x 3.7 cm ECHOGENICITY:  Normal. HYDRONEPHROSIS:  None. CYSTS/STONES/MASSES:  Simple cyst along the midpole of the right kidney measuring 1.2 cm.  This is decreased in size from prior imaging, previously measuring 2.1 cm.  LEFT KIDNEY MEASUREMENTS:  10.4 x 5.2 x 4.5 cm ECHOGENICITY:  Normal. HYDRONEPHROSIS:  None. CYSTS/STONES/MASSES:  None.  BLADDER:  The bladder is decompressed with the presence of a Cooper catheter.            CONCLUSION:  No acute process or significant disease noted.  Right midpole renal cyst is decreased in size from prior imaging.   LOCATION:  [facility]     Dictated by (CST): Gabriele Lucero DO on 1/28/2024 at 9:04 AM     Finalized by (CST): Gabriele Lucero DO on 1/28/2024 at 9:07 AM       XR CHEST PA + LAT CHEST (CPT=71046)    Result Date: 1/26/2024  PROCEDURE:  XR CHEST PA + LAT CHEST (CPT=71046)  INDICATIONS:  PM implant  COMPARISON:  [facility] , XR, XR CHEST AP PORTABLE  (CPT=71045), 1/25/2024, 6:50 PM.  [facility] , XR, XR CHEST AP PORTABLE  (CPT=71045), 1/23/2024, 3:53 PM.  TECHNIQUE:  PA and lateral chest radiographs were obtained.  PATIENT STATED HISTORY: (As transcribed by Technologist)  Pacemaker implant.    FINDINGS:  Stable left-sided pacemaker.  Hyperexpansion of the lungs.  No pleural effusions.  No pneumothorax.  Stable cardiac size.  Osteoarthritic changes within the shoulders.  Atheromatous calcifications of the aorta.            CONCLUSION:  1. Hyperexpansion of the lungs.  2. Stable left-sided pacemaker placement.    LOCATION:  [facility]   Dictated by (CST): Christine Lucas MD on 1/26/2024 at 10:01 AM     Finalized by (CST): Christine Lucas MD on 1/26/2024 at 10:04 AM       XR CHEST AP PORTABLE  (CPT=71045)    Result Date: 1/25/2024  PROCEDURE:  XR CHEST AP PORTABLE  (CPT=71045)  TECHNIQUE:  AP chest radiograph was obtained.  COMPARISON:  [facility] , XR, XR CHEST AP PORTABLE  (CPT=71045), 1/23/2024, 3:53 PM.   INDICATIONS:  PM implant  PATIENT STATED HISTORY: (As transcribed by Technologist)  Patient offered no additional history at this time.     FINDINGS:  Left chest pacemaker noted.  Cardiac silhouette is mildly enlarged.  Minimal bibasilar atelectasis and/or scarring.  Lungs are hyperexpanded.  No significant pleural fluid or pneumothorax.            CONCLUSION:  Minimal bibasilar atelectasis/scarring.  No measurable pneumothorax.   LOCATION:  [facility]      Dictated by (CST): Stromberg, LeRoy, MD on 2024 at 7:23 PM     Finalized by (CST): Stromberg, LeRoy, MD on 2024 at 7:25 PM       CATH EP    Result Date: 2024  This exam has been completed. Please refer to Notes for the results to this procedure.    CARD ECHO 2D DOPPLER (CPT=93306)    Result Date: 2024  Transthoracic Echocardiogram Name:Evelyn Reeves Date: 2024 :  1940 Ht:  (61in)  BP: 119 / 50 MRN:  [id number]     Age:  83years    Wt:  (147lb) HR: 36bpm Loc:  [facility]       Gndr: F          BSA: 1.66m^2 Sonographer: Yamilka CASH AE, PE Ordering:    Juan Luis Gonzalez Consulting:  Kvng Plummer ---------------------------------------------------------------------------- History/Indications:  Bradycardia, Chronic chf. Second degree atrioventricular block. ---------------------------------------------------------------------------- Procedure information:  A transthoracic complete 2D study was performed. Additional evaluation included M-mode, complete spectral Doppler, and color Doppler.  Patient status:  Inpatient.  Location:  Bedside.    This was a routine study. Transthoracic echocardiography for ventricular function evaluation. Image quality was adequate. ---------------------------------------------------------------------------- Conclusions: 1. Left ventricle: The cavity size was normal. Wall thickness was normal.    Systolic function was normal. The estimated ejection fraction was 60-65%.    Unable to assess LV diastolic function. 2.  Left atrium: The atrium was mildly dilated. 3. Mitral valve: There was mild regurgitation. 4. Pulmonary arteries: Systolic pressure was at the upper limits of normal,    estimated to be 31mm Hg. Estimated pulmonary artery diastolic pressure    was 8mm Hg. Impressions:  No previous study was available for comparison. * ---------------------------------------------------------------------------- * Findings: Left ventricle:  The cavity size was normal. Wall thickness was normal. Systolic function was normal. The estimated ejection fraction was 60-65%. Unable to assess LV diastolic function. Left atrium:  The atrium was mildly dilated. Right ventricle:  The cavity size was normal. Systolic function was normal. Right atrium:  The atrium was normal in size. Mitral valve:  The valve was structurally normal. Leaflet separation was normal.  Doppler:  Transvalvular velocity was within the normal range. There was no evidence for stenosis. There was mild regurgitation. Aortic valve:   The valve was trileaflet. The leaflets were mildly calcified. Cusp separation was normal.  Doppler:  Transvalvular velocity was within the normal range. There was no evidence for stenosis. There was no significant regurgitation.    The mean systolic gradient was 8mm Hg. The peak systolic gradient was 15mm Hg. Tricuspid valve:  The valve is structurally normal. Leaflet separation was normal.  Doppler:  Transvalvular velocity was within the normal range. There was no evidence for stenosis. There was mild regurgitation. Pulmonic valve:   The valve is structurally normal. Cusp separation was normal.  Doppler:  Transvalvular velocity was within the normal range. There was no evidence for stenosis. There was trivial regurgitation. Pericardium:   There was no pericardial effusion. Aorta: Aortic root: The aortic root was normal-sized. Ascending aorta: The ascending aorta was normal. Pulmonary arteries: The main pulmonary artery was normal-sized. Systolic  pressure was at the upper limits of normal, estimated to be 31mm Hg. Estimated pulmonary artery diastolic pressure was 8mm Hg. Systemic veins: Inferior vena cava: The IVC was normally collapsible and normal-sized. ---------------------------------------------------------------------------- Measurements  Left ventricle                    Value        Ref  IVS thickness, ED, PLAX           0.9   cm     0.6 -                                                 0.9  LV ID, ED, PLAX                   4.5   cm     3.8 -                                                 5.2  LV ID, ES, PLAX                   2.9   cm     2.2 -                                                 3.5  LV PW thickness, ED, PLAX     (H) 1.0   cm     0.6 -                                                 0.9  IVS/LV PW ratio, ED, PLAX         0.97         --------  LV PW/LV ID ratio, ED, PLAX       0.22         --------  LV ejection fraction              64    %      54 - 74  LVOT                              Value        Ref  LVOT peak velocity, S             1.05  m/sec  --------  LVOT VTI, S                       29.9  cm     --------  LVOT mean gradient, S             2     mm Hg  --------  Aortic valve                      Value        Ref  Aortic valve peak velocity, S     1.96  m/sec  --------  Aortic valve VTI, S               55.4  cm     --------  Aortic mean gradient, S           8     mm Hg  --------  Aortic peak gradient, S           15    mm Hg  --------  Velocity ratio, peak, LVOT/AV     0.54         --------  Aortic root                       Value        Ref  Aortic root ID, ED, MM            3.2   cm     --------  Ascending aorta                   Value        Ref  Ascending aorta ID, A-P, ED       3.1   cm     1.9 -                                                 3.5  Left atrium                       Value        Ref  LA volume, ES, 1-p A4C        (H) 69    ml     22 - 52  LA ID, A-P, ES, MM            (H) 4.6   cm     2.7 -                                                  3.8  LA ID/bsa, A-P, ES, MM        (H) 2.8   cm/m^2 1.5 -                                                 2.3  LA/aortic root ratio, MM          1.44         --------  Pulmonary artery                  Value        Ref  PA pressure, S, DP                31    mm Hg  --------  PA pressure, ED, DP               8     mm Hg  --------  Tricuspid valve                   Value        Ref  Tricuspid regurg peak             2.66  m/sec  <=2.8  velocity  Tricuspid peak RV-RA gradient     28    mm Hg  --------  Systemic veins                    Value        Ref  Estimated CVP                     3     mm Hg  --------  Right ventricle                   Value        Ref  RV pressure, S, DP                31    mm Hg  --------  Pulmonic valve                    Value        Ref  Pulmonic regurg velocity, ED      1.14  m/sec  --------  Pulmonic regurg gradient, ED      5     mm Hg  -------- Legend: (L)  and  (H)  gray values outside specified reference range. ---------------------------------------------------------------------------- Prepared and electronically signed by Leeroy Colindres MD 01/24/2024 15:44     XR CHEST AP PORTABLE  (CPT=71045)    Result Date: 1/23/2024  PROCEDURE:  XR CHEST AP PORTABLE  (CPT=71045)  TECHNIQUE:  AP chest radiograph was obtained.  COMPARISON:  08/10/2022  INDICATIONS:  2nd degree Heart Block-from [facility]  PATIENT STATED HISTORY: (As transcribed by Technologist)  Pt. with 2nd degree Heart Block-from [facility]                 CONCLUSION:   Stable cardiac and mediastinal contours.  Stable prominence of the pulmonary vasculature.  Mild reticular scarring atelectasis of the right lung base without pulmonary edema or acute airspace disease.  The pleural spaces are clear.   LOCATION:  [facility]      Dictated by (CST): Hugo Lopez MD on 1/23/2024 at 4:34 PM     Finalized by (CST): Hugo Lopez MD on 1/23/2024 at 4:35 PM         Operative  reports:      Hospital course:    Evelyn Reeves Is a a 83 year old female who presents with LEONARD and indwelling catheter malfucntion     LEONARD  -- Cr 2.9 on admission, baseline normal   -- likely secondary to malfunctioning IFC however they may be a component of dehydration   -- Renal US unremarkable   -- Cr now normalized with zhao exchange and ivf. Now off IVF and Cr remains stable. Her PO intake is good  Dw renal - ok to resume acei and home meds      Chronic Indwelling Zhao Catheter  Abnormal UA  -- UA not suggestive of UTI, no fevers, no leukocytosis, Cr improving with ivf  -- defer antibiotics at this time, CTM, Ucx with low cfu of psar.  She has improved wo abx, suspect asymptotic pyruia. Will hold off treatment at thsi time     Anxiety  -- resume home alprazolam prn     HTN  -- resume home meds        Day of discharge exam:  Vitals:    01/29/24 1200   BP: (!) 164/57   Pulse:    Resp: 18   Temp: 98.2 °F (36.8 °C)     Po intake is better  No nv  Feels well  Dw daughter    No acute distress, alert and oriented   Lungs clear  Heart regular  Abdomen benign    Total time coordinating care 32 min      Patient and/or family had opportunity to ask questions and expressed understanding and agreement with therapeutic plan as outlined         [facility] Hospitalist  [phone]  Answering Service: [phone]

## 2024-01-29 NOTE — PLAN OF CARE
NURSING DISCHARGE NOTE    Discharged Nursing home via Ambulance.  Accompanied by Support staff  Belongings Taken by patient/family.  AVS paperwork reviewed with pt and sent with family to facility  PIV removed  Cooper bag intact  Report given to Tamanna CAMPUZANO at [facility]

## 2024-01-29 NOTE — PLAN OF CARE
Assumed care at 0730  A/ox4.  RA. Vpaced on tele   Denies any pain  Regular diet  Cooper in place draining yellow urine   Ambulates x1 with walker   PIV saline locked  Safety precautions in place. All needs met at this time    Problem: Patient/Family Goals  Goal: Patient/Family Long Term Goal  Description: Patient's Long Term Goal:   Discharge with appropriate resources    Interventions:  - comply with POC  - See additional Care Plan goals for specific interventions  Outcome: Progressing  Goal: Patient/Family Short Term Goal  Description: Patient's Short Term Goal:   1/28 noc: \"sleep tonight\"    Interventions:   - cluster care  -prn xanax  - See additional Care Plan goals for specific interventions  Outcome: Progressing     Problem: PAIN - ADULT  Goal: Verbalizes/displays adequate comfort level or patient's stated pain goal  Description: INTERVENTIONS:  - Encourage pt to monitor pain and request assistance  - Assess pain using appropriate pain scale  - Administer analgesics based on type and severity of pain and evaluate response  - Implement non-pharmacological measures as appropriate and evaluate response  - Consider cultural and social influences on pain and pain management  - Manage/alleviate anxiety  - Utilize distraction and/or relaxation techniques  - Monitor for opioid side effects  - Notify MD/LIP if interventions unsuccessful or patient reports new pain  - Anticipate increased pain with activity and pre-medicate as appropriate  Outcome: Progressing     Problem: RISK FOR INFECTION - ADULT  Goal: Absence of fever/infection during anticipated neutropenic period  Description: INTERVENTIONS  - Monitor WBC  - Administer growth factors as ordered  - Implement neutropenic guidelines  Outcome: Progressing     Problem: GENITOURINARY - ADULT  Goal: Absence of urinary retention  Description: INTERVENTIONS:  - Assess patient’s ability to void and empty bladder  - Monitor intake/output and perform bladder scan as  needed  - Follow urinary retention protocol/standard of care  - Consider collaborating with pharmacy to review patient's medication profile  - Implement strategies to promote bladder emptying  Outcome: Progressing

## 2024-01-30 NOTE — DISCHARGE SUMMARY
[facility]    DISCHARGE SUMMARY     Evelyn Reeves Patient Status:  Inpatient    1940 MRN [id number]   Location [facility] 8NE-A Attending No att. 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 Patient was found to have 2nd degree type 2 av block requiring a PM. Cardiology was consulted. Patient had a successful PM installed and was stable afterwards. Patient cleared by cardiology for discharge. Patient clinically stable, labs stable and vital stable. Patient agreeable with discharge.     Discharge Medication List:     Discharge Medications        CONTINUE taking these medications        Instructions Prescription details   acetaminophen 325 MG Tabs  Commonly known as: Tylenol      Take 2 tablets (650 mg total) by mouth every 6 (six) hours as needed for Pain.   Refills: 0     ALPRAZolam 0.25 MG Tabs  Commonly known as: Xanax      Take 1 tablet (0.25 mg total) by mouth nightly as needed for Anxiety.   Refills: 0     calcium carbonate 500 MG Chew  Commonly known as: Tums      Chew 1 tablet (500 mg total) by mouth daily.   Refills: 0     lisinopril 40 MG Tabs  Commonly known as: Prinivil; Zestril      Take 1 tablet (40 mg total) by mouth daily.   Refills: 0     methenamine 1 g Tabs  Commonly known as: Hiprex      Take 1 tablet (1 g total) by mouth 2 (two) times daily.   Refills: 0     Milk of Magnesia 400 MG/5ML Susp  Generic drug: magnesium hydroxide      Take 30 mL by mouth daily as needed for constipation.   Refills: 0     senna-docusate 8.6-50 MG Tabs  Commonly known as: Senokot-S      Take 2 tablets by mouth 2 (two) times daily as needed for constipation.   Refills:  0     spironolactone 25 MG Tabs  Commonly known as: Aldactone      Take 0.5 tablets (12.5 mg total) by mouth daily.   Refills: 0            STOP taking these medications      ibuprofen 200 MG Tabs  Commonly known as: Motrin               Follow-up appointment:   Brendan Love MD  [address] [place] [address] [phone]    Follow up  As needed    El Watt MD  [address]  [facility] [address] [phone]    Follow up in 2 week(s)      Appointments for Next 30 Days 1/30/2024 - 2/29/2024      None            OBJECTIVE:  Temp:  [98 °F (36.7 °C)-98.3 °F (36.8 °C)] 98 °F (36.7 °C)  Pulse:  [] 75  Resp:  [15-18] 18  BP: ()/(30-74) 157/56  SpO2:  [94 %-95 %] 94 %  Exam  Gen: No acute distress, alert and oriented x3  Pulm: Lungs clear bilaterally, normal respiratory effort, no crackles, no wheezing  CV: Bradycardic, no murmur.  Abd: Abdomen soft, nontender, nondistended, bowel sounds present  MSK: No significant pitting edema or tenderness of the LE  Skin: no rashes or lesions  -----------------------------------------------------------------------------------------------  PATIENT DISCHARGE INSTRUCTIONS: See electronic chart    ASSESSMENT / PLAN:      83 year old female with PMH sig for htn, chf, dementia per family, here for low HR     Impression     -bradycardia / 2nd degree AVB type 2  -chronic CHF   -HTN  -dementia     Plan     *CV  -current HR in low 40s, pt asymptomatic  -Tsh ok   -Echo pending--> no acute pathology   -EP eval / cards eval pending --> POD # 1 s/p PM placement  -cardiology --> ok for dc as interrogation of PM is wnl     *chronic CHF  -cont laisx / aldactone per cards  -Does not appear grossly overloaded     *HTN  -on lisinopril, conitnued       Dispo: discharge      Kapil Ray MD  [facility]  [phone]    Time spent:  > 35 minutes

## 2024-01-31 NOTE — CDS QUERY
Clinical Documentation Clarification    Dear Dr. Ray,   Based on your judgment and the clinical indicators below, can you please clarify the type of heart failure you were treating, based on the current Cardiac 2D Echo?   (   ) Chronic Diastolic Heart Failure  (  x ) Other - please specify: unknown  ___________________________________________________________________________    Risk Factors/Clinical Indicators:   83-year-old female admitted from nursing home with bradycardia. History of HTN< CHF< dementia.    EKG: sinus rhythm with 2:1 heart block with underlying right bundle-branch block.     1/24 Cardiac 2D Echo: Left ventricle: The cavity size was normal. Wall thickness was normal.   Systolic function was normal. The estimated ejection fraction was 60-65%.   Unable to assess LV diastolic function.     125 Procedure Note: Successful implantation of a Medtronic AV pacemaker. Atrial lead through the left subclavian vein, ventricular lead through the left cephalic vein.     Treatment: Lasix 40 mg po daily, spironolactone 12.5 mg po daily, lisinopril 40 mg po daily (as taken at home).    For questions regarding this query, please contact Clinical :   Isabelle Crain RN        Cell# [phone]                          Thank you!

## 2025-05-27 ENCOUNTER — HOSPITAL ENCOUNTER (EMERGENCY)
Facility: HOSPITAL | Age: 85
Discharge: HOME | End: 2025-05-27
Attending: EMERGENCY MEDICINE
Payer: MEDICARE

## 2025-05-27 VITALS
SYSTOLIC BLOOD PRESSURE: 144 MMHG | RESPIRATION RATE: 16 BRPM | HEIGHT: 62 IN | TEMPERATURE: 99 F | DIASTOLIC BLOOD PRESSURE: 70 MMHG | BODY MASS INDEX: 24.48 KG/M2 | OXYGEN SATURATION: 98 % | WEIGHT: 133 LBS | HEART RATE: 75 BPM

## 2025-05-27 DIAGNOSIS — B02.30 HERPES ZOSTER WITH OPHTHALMIC COMPLICATION, UNSPECIFIED HERPES ZOSTER EYE DISEASE: Primary | ICD-10-CM

## 2025-05-27 PROCEDURE — 99284 EMERGENCY DEPT VISIT MOD MDM: CPT

## 2025-05-27 PROCEDURE — 99283 EMERGENCY DEPT VISIT LOW MDM: CPT

## 2025-05-27 RX ORDER — GABAPENTIN 100 MG/1
100 CAPSULE ORAL ONCE
Status: COMPLETED | OUTPATIENT
Start: 2025-05-27 | End: 2025-05-27

## 2025-05-27 RX ORDER — VALACYCLOVIR HYDROCHLORIDE 500 MG/1
1000 TABLET, FILM COATED ORAL ONCE
Status: COMPLETED | OUTPATIENT
Start: 2025-05-27 | End: 2025-05-27

## 2025-05-27 RX ORDER — GABAPENTIN 100 MG/1
100 CAPSULE ORAL 3 TIMES DAILY
Qty: 30 CAPSULE | Refills: 0 | Status: SHIPPED | OUTPATIENT
Start: 2025-05-27 | End: 2025-06-06

## 2025-05-27 RX ORDER — ERYTHROMYCIN 5 MG/G
1 OINTMENT OPHTHALMIC ONCE
Status: COMPLETED | OUTPATIENT
Start: 2025-05-27 | End: 2025-05-27

## 2025-05-27 RX ORDER — PREDNISONE 20 MG/1
40 TABLET ORAL ONCE
Status: COMPLETED | OUTPATIENT
Start: 2025-05-27 | End: 2025-05-27

## 2025-05-27 RX ORDER — VALACYCLOVIR HYDROCHLORIDE 1 G/1
1000 TABLET, FILM COATED ORAL 3 TIMES DAILY
Qty: 21 TABLET | Refills: 0 | Status: SHIPPED | OUTPATIENT
Start: 2025-05-27 | End: 2025-06-03

## 2025-05-27 RX ORDER — PREDNISONE 20 MG/1
40 TABLET ORAL DAILY
Qty: 8 TABLET | Refills: 0 | Status: SHIPPED | OUTPATIENT
Start: 2025-05-27 | End: 2025-05-31

## 2025-05-27 NOTE — ED INITIAL ASSESSMENT (HPI)
Pt presents to ER with rash to right side of face that is going into scalp and near eye. This was first noticed yesterday at there facility. No other rash on her body per patient.  Pt cannot say if there are any visual changes. No other sick like symptoms reported.

## 2025-05-28 NOTE — ED PROVIDER NOTES
Patient Seen in: [facility] Emergency Department        History  Chief Complaint   Patient presents with    Rash     Possible shingles to R side for head      Stated Complaint:     Subjective:   HPI            Rash on right forehead and around eye - does have some dementia   Has had the rash since yesterday   Has an appointment with ophthalmology in the morning   Pain in the right ear       Objective:     Past Medical History:    Abdominal hernia    Abdominal pain    Anemia    Anxiety    Arthritis    Atypical mole    Back pain    Bad breath    Bloating    Blurred vision    Congestive heart disease (HCC)    Constipation    Depressed state    never required treatment    Depression    Diarrhea, unspecified    Easy bruising    Enlarged lymph node    Esophageal reflux    Flatulence/gas pain/belching    Frequent use of laxatives    Hearing impairment    Hearing loss    Hemorrhoids    High blood pressure    History of cardiac murmur    Hoarseness, chronic    per patient from Allergies    Incontinence    Indigestion    Irregular bowel habits    Migraine headache    Nausea    Night sweats    Osteoarthritis    Osteoporosis    Pain in joints    Pain with bowel movements    Painful urination    Presence of other cardiac implants and grafts    2 hip replacements    Stress    Uncomfortable fullness after meals    Urinary retention    Weight loss              Past Surgical History:   Procedure Laterality Date    Benign biopsy right      Cataract      Colonoscopy N/A 04/08/2021    Procedure: colonoscopy;  Surgeon: Reagan Watt MD;  Location:  ENDOSCOPY    Colonoscopy      Drain/inject large joint/bursa  08/22/2013    Procedure: HIP INJECTION (PAIN);  Surgeon: Julián Suarez MD;  Location: [facility]    Fluoroscopic guidance needle placement  08/22/2013    Procedure: HIP INJECTION (PAIN);  Surgeon: Julián Suarez MD;  Location: [facility]    Hip replacement surgery       bilateral   Dr turner  right 2015  left 2016     Other      R THR    Other surgical history      hernia 1968    Other surgical history      breast biopsy 1980    Other surgical history  04/25/2014    flow [facility]- Dr. Ron    Other surgical history  07/01/2019    cysto dr moreno    Other surgical history  01/12/2022    cystoscopy-     Patient documented not to have experienced any of the following events  08/22/2013    Procedure: HIP INJECTION (PAIN);  Surgeon: Julián Suarez MD;  Location: [facility]    Patient withough preoperative order for iv antibiotic surgical site infection prophylaxis.  08/22/2013    Procedure: HIP INJECTION (PAIN);  Surgeon: Julián Suarez MD;  Location: [facility]    Tonsillectomy      Total hip replacement                  Social History     Socioeconomic History    Marital status:     Number of children: 5   Occupational History    Occupation: homemaker   Tobacco Use    Smoking status: Never     Passive exposure: Never    Smokeless tobacco: Never   Vaping Use    Vaping status: Never Used   Substance and Sexual Activity    Alcohol use: Yes     Alcohol/week: 5.0 standard drinks of alcohol     Types: 3 Glasses of wine, 2 Shots of liquor per week    Drug use: No    Sexual activity: Not Currently     Partners: Male     Social Drivers of Health     Food Insecurity: No Food Insecurity (1/28/2024)    Food Insecurity     Food Insecurity: Never true   Transportation Needs: No Transportation Needs (1/28/2024)    Transportation Needs     Lack of Transportation: No   Housing Stability: Low Risk  (1/28/2024)    Housing Stability     Housing Instability: No                                Physical Exam    ED Triage Vitals [05/27/25 1759]   /68   Pulse 87   Resp 18   Temp 99.2 °F (37.3 °C)   Temp src Temporal   SpO2 97 %   O2 Device None (Room air)       Current Vitals:   No data recorded          Physical Exam        Has lens implants in right and  left eye   Has some flourescein uptake in right eye - unclear if dendritic  PERRL  Right ear appears normal -     ED Course  Labs Reviewed - No data to display       Patietn and daughter reassured- first doses of meds herer  Seeing my favorite opthalmolgist in the AM                  MDM     Rash on face for 2 days- sent here from nursing facility despite appt with Optho in AM- not on meds yet- patient nontoxic, while she has V1 zoster - no signs of herpes encephalitis, meningitis, or severe systemic disease- vision at baseline  Patient with underlying dementia but started on valacyclovir, and prednisone- wanted something for eyelid irritation- erythromycin may help and will not hurt- sample given until optho appointment in AM        Medical Decision Making      Disposition and Plan     Clinical Impression:  1. Herpes zoster with ophthalmic complication, unspecified herpes zoster eye disease         Disposition:  Discharge  5/27/2025  8:42 pm    Follow-up:  Ayleen Willis, Ashlee PRESSLEY  [address] [facility] [id number] [phone]    Follow up  in the morning, please tell her I said Hi!          Medications Prescribed:  Discharge Medication List as of 5/27/2025  9:01 PM        START taking these medications    Details   valACYclovir 1 G Oral Tab Take 1 tablet (1,000 mg total) by mouth 3 (three) times daily for 7 days., Print, Disp-21 tablet, R-0      gabapentin 100 MG Oral Cap Take 1 capsule (100 mg total) by mouth 3 (three) times daily for 10 days., Print, Disp-30 capsule, R-0      predniSONE 20 MG Oral Tab Take 2 tablets (40 mg total) by mouth daily for 4 days., Print, Disp-8 tablet, R-0                   Supplementary Documentation:

## (undated) DEVICE — SNARE CAPTIFLEX MICRO-OVL OLY

## (undated) DEVICE — TRAP 4 CPTR CHMBR N EZ INLN

## (undated) DEVICE — FORCEP RADIAL JAW 4

## (undated) DEVICE — FILTERLINE NASAL ADULT O2/CO2

## (undated) DEVICE — ENDOSCOPY PACK - LOWER: Brand: MEDLINE INDUSTRIES, INC.

## (undated) DEVICE — ENDOSCOPY PACK UPPER: Brand: MEDLINE INDUSTRIES, INC.

## (undated) DEVICE — 1200CC GUARDIAN II: Brand: GUARDIAN

## (undated) DEVICE — Device: Brand: DEFENDO AIR/WATER/SUCTION AND BIOPSY VALVE

## (undated) DEVICE — 3M™ RED DOT™ MONITORING ELECTRODE WITH FOAM TAPE AND STICKY GEL, 50/BAG, 20/CASE, 72/PLT 2570: Brand: RED DOT™

## (undated) NOTE — LETTER
September 1, 2020    [place] Samuel [id number]      Dear Clara Mandujano: The following are the results of your recent tests ordered by Dr. Treasure Rai. Please review the list of test results.   Your result is the number on t

## (undated) NOTE — ED AVS SNAPSHOT
[facility] in [address] [place] [address] Drive,[address]    [address] [place] [address]    Phone:  [phone]    Fax:  [phone] [place]   MRN: [id number]    Department:  [facility]   Date of Visit:  6/6/201 Discharge References/Attachments     SOFT TISSUE CONTUSION (ENGLISH)    SKIN AVULSION (ENGLISH)    HEART-HEALTHY FOOD, EATING: USING THE DASH PLAN (ENGLISH)      Disclosure     Insurance plans vary and the physician(s) referred by the Immediate Care may no IF THERE IS ANY CHANGE OR WORSENING OF YOUR CONDITION, CALL YOUR PRIMARY CARE PHYSICIAN AT ONCE OR GO TO THE EMERGENCY DEPARTMENT.     If you have been prescribed any medication(s), please fill your prescription right away and begin taking the medication(s) [facility] [address] to help you get signed up for insurance coverage. [address] is a Federal Navigator program that can help with your Affordable Care Act coverage, as well as all types of Medicaid plans.   To get signed up and covere

## (undated) NOTE — IP AVS SNAPSHOT
Patient Demographics     Address  [address]   [place] [address] Phone  [phone] (Home)  [phone] (Mobile) *Preferred* E-mail Address  [email or web address]      Patient Contacts     Name Relation Home Work Mobile    Earlene Ruiz Power of    [phone]    Neeta Figueredo Daughter   [phone]      Allergies as of 1/26/2024  Review status set to Review Complete on 1/23/2024       Noted Reaction Type Reactions    DELETED: Benadryl [diphenhydramine Hcl] 07/30/2013    DIZZINESS, FATIGUE    sleepy    Levaquin [levofloxacin] 05/05/2021    PAIN    Tendonitis of the Achilles tendons mild    DELETED: Bactrim [sulfamethoxazole W/trimethoprim] 09/14/2016    UNKNOWN    DELETED: Nitrofurantoin 10/13/2016    DIZZINESS, FATIGUE    Pounding heart.    Adhesive Tape 08/10/2022   Intolerance OTHER (SEE COMMENTS)    Welts    DELETED: Keflex [cephalexin] 08/19/2015    NAUSEA ONLY    Weakness  Pt states should not be listed as allergy    Neosporin Original [neomycin-bacitracin-polymyxin] 06/06/2017    UNKNOWN    Pt reports she was not sure of the reaction, it was 15 yrs ago.  Tolerates polysporin    Sulfa Antibiotics 06/06/2017   Intolerance UNKNOWN    \"Did not feel good.\" Daughters do not believe patient is allergic. Bactrim taken many times according to med history.      Code Status Information     Code Status    DNAR/Selective Treatment        Patient Instructions       Resume home health with [facility] at discharge.     [facility]  [address]  [place] [address]  Phone: [phone]  Fax: [phone]     Follow-up Information     Brendan Love MD Follow up.    Specialty: Family Medicine  Why: As needed  Contact information:  [address] [place] [address] [phone]                        Your Home Meds List      TAKE these medications       Instructions Authorizing Provider Morning Afternoon Evening As Needed   acetaminophen 325 MG Tabs  Commonly known as:  Tylenol      Take 2 tablets (650 mg total) by mouth every 6 (six) hours as needed for Pain.          ALPRAZolam 0.25 MG Tabs  Commonly known as: Xanax      Take 1 tablet (0.25 mg total) by mouth nightly as needed for Anxiety.          calcium carbonate 500 MG Chew  Commonly known as: Tums  Next dose due: Resume home schedule      Chew 1 tablet (500 mg total) by mouth daily.          furosemide 40 MG Tabs  Commonly known as: Lasix  Next dose due: Tomorrow 1/27/24      Take 1 tablet (40 mg total) by mouth daily.          lisinopril 40 MG Tabs  Commonly known as: Prinivil; Zestril  Next dose due: Tomorrow 1/27/24      Take 1 tablet (40 mg total) by mouth daily.          methenamine 1 g Tabs  Commonly known as: Hiprex  Next dose due: Tonight 1/26/24      Take 1 tablet (1 g total) by mouth 2 (two) times daily.          Milk of Magnesia 400 MG/5ML Susp  Generic drug: magnesium hydroxide      Take 30 mL by mouth daily as needed for constipation.          senna-docusate 8.6-50 MG Tabs  Commonly known as: Senokot-S      Take 2 tablets by mouth 2 (two) times daily as needed for constipation.          spironolactone 25 MG Tabs  Commonly known as: Aldactone  Next dose due: Tomorrow 1/27/24      Take 0.5 tablets (12.5 mg total) by mouth daily.                   [id number]-A - MAR ACTION REPORT  (last 48 hrs)    ** SITE UNKNOWN **     Order ID Medication Name Action Time Action Reason Comments    [id number] acetaminophen (Tylenol Extra Strength) tab 500 mg 01/26/24 0905 Given      [id number] ceFAZolin (Ancef) 2 g in 20mL IV syringe premix 01/25/24 1715 Given by Other  given in cath lab @ 1715 per paper procedure report    [id number] ceFAZolin (Ancef) 2 g in 20mL IV syringe premix 01/26/24 0205 Given      [id number] ceFAZolin (Ancef) 2 g in 20mL IV syringe premix 01/26/24 0906 Given      [id number] chlorhexidine (Hibiclens) 4 % external liquid 30 mL 01/25/24 0630 Given      [id number] furosemide (Lasix) tab 40 mg 01/25/24 0820 Given       [id number] furosemide (Lasix) tab 40 mg 01/26/24 0906 Given      [id number] glycerin-hypromellose- (Artifical Tears) 0.2-0.2-1 % ophthalmic solution 1 drop 01/25/24 1319 Given      [id number] lisinopril (Prinivil; Zestril) tab 40 mg 01/25/24 0820 Given      [id number] lisinopril (Prinivil; Zestril) tab 40 mg 01/26/24 0906 Given      [id number] spironolactone (Aldactone) partial tab 12.5 mg 01/25/24 0820 Given      [id number] spironolactone (Aldactone) partial tab 12.5 mg 01/26/24 0906 Given              Recent Vital Signs    Flowsheet Row Most Recent Value   /52 Filed at 01/26/2024 1635   Pulse 72 Filed at 01/26/2024 1635   Resp 18 Filed at 01/26/2024 1635   Temp 98.2 °F (36.8 °C) Filed at 01/26/2024 1635   SpO2 98 % Filed at 01/26/2024 1219      Patient's Most Recent Weight    Flowsheet Row Most Recent Value   Patient Weight 61.2 kg (134 lb 14.7 oz)         Lab Results Last 24 Hours      Basic Metabolic Panel (8) [[id number]] (Abnormal)  Resulted: 01/26/24 0604, Result status: Final result   Ordering provider: Kapil Ray MD  01/25/24 2300 Resulting lab: [facility] ([facility])    Specimen Information    Type Source Collected On   Blood — 01/26/24 0313          Components    Component Value Reference Range Flag Lab   Glucose 106 70 - 99 mg/dL [facility] ([facility])   Sodium 140 136 - 145 mmol/L — [facility])   Potassium 3.8 3.5 - 5.1 mmol/L — [facility])   Chloride 109 98 - 112 mmol/L — [facility] ([facility])   CO2 28.0 21.0 - 32.0 mmol/L — [facility])   Anion Gap 3 0 - 18 mmol/L — [facility])   BUN 32 9 - 23 mg/dL [facility] ([facility])   Creatinine 0.88 0.55 - 1.02 mg/dL — [facility] ([facility])   Calcium, Total 9.6 8.5 - 10.1 mg/dL — [facility] ([facility])   Calculated Osmolality 297 275 - 295 mOsm/kg [facility] ([facility])   eGFR-Cr 66 >=60 mL/min/1.73m2 — [facility] ([facility])            Magnesium [[id number]] (Normal)  Resulted: 01/26/24 0604, Result status: Final result   Ordering provider:  Kapil Ray MD  01/25/24 2300 Resulting lab: [facility] ([facility])    Specimen Information    Type Source Collected On   Blood — 01/26/24 0453          Components    Component Value Reference Range Flag Lab   Magnesium 2.4 1.6 - 2.6 mg/dL — [facility])            CBC With Differential With Platelet [[id number]] (Abnormal)  Resulted: 01/26/24 0535, Result status: Final result   Ordering provider: Kapil Ray MD  01/25/24 2300 Resulting lab: [facility] ([facility])   Narrative:  The following orders were created for panel order CBC With Differential With Platelet.  Procedure                               Abnormality         Status                     ---------                               -----------         ------                     CBC W/ DIFFERENTIAL[[id number]]          Abnormal            Final result                 Please view results for these tests on the individual orders.    Specimen Information    Type Source Collected On   Blood — 01/26/24 0453            Testing Performed By     Lab - Abbreviation Name Director Address Valid Date Range    139 - [facility] ([facility], [facility]) [facility] ([facility]) Goldberg, Cathryn A. MD [address] [place] [id number] 03/19/20 1441 - Present            Microbiology Results (All)     None         H&P - H&P Note      H&P signed by Kvng Plummer DO at 1/23/2024  8:43 PM  Version 1 of 1    Author: Kvng Plummer DO Service: — Author Type: Physician    Filed: 1/23/2024  8:43 PM Date of Service: 1/23/2024  8:37 PM Status: Signed    : Kvng Plummer DO (Physician)       [facility] H&P/Consult note       CC:   Chief Complaint   Patient presents with    Arrythmia/Palpitations        PCP: Brendan Love MD    History of Present Illness: Patient is a 83 year old female with PMH sig for htn, chf, dementia per family, here for low HR  She resides in [place] and was noted to be  bradycardic, EKG showed AVB.   She deneida ayn dizziness / syncope, chest pain but states ahas been more fatiuged recently  Family at [facility]  Past Medical History:   Diagnosis Date    Abdominal hernia     Abdominal pain     Anemia     Anxiety     Arthritis     Atypical mole     Back pain     Bad breath     Bloating     Blurred vision     Constipation     Depressed state     never required treatment    Diarrhea, unspecified     Easy bruising     Enlarged lymph node     Flatulence/gas pain/belching     Frequent use of laxatives     GERD (gastroesophageal reflux disease)     Dr. Valencia EGS adn colosncopy 2010    Hearing loss     Hemorrhoids     History of cardiac murmur     Hoarseness, chronic     per patient from Allergies    Indigestion     Irregular bowel habits     Migraine headache     Nausea     Night sweats     Osteoporosis     Pain in joints     Pain with bowel movements     Painful urination     Presence of other cardiac implants and grafts     2 hip replacements    Stress     Uncomfortable fullness after meals     Urinary retention     Weight loss         PSH  Past Surgical History:   Procedure Laterality Date    COLONOSCOPY N/A 4/8/2021    Procedure: colonoscopy;  Surgeon: Reagan Watt MD;  Location:  ENDOSCOPY    DRAIN/INJECT LARGE JOINT/BURSA  8/22/2013    Procedure: HIP INJECTION (PAIN);  Surgeon: Julián Suarez MD;  Location: [facility]    FLUOROSCOPIC GUIDANCE NEEDLE PLACEMENT  8/22/2013    Procedure: HIP INJECTION (PAIN);  Surgeon: Julián Suarez MD;  Location: [facility]    HIP REPLACEMENT SURGERY      bilateral   Dr turner  right 2015  left 2016     OTHER      R THR    OTHER SURGICAL HISTORY      hernia 1968    OTHER SURGICAL HISTORY      breast biopsy 1980    OTHER SURGICAL HISTORY  04/25/2014    flow [facility]- Dr. Ron    OTHER SURGICAL HISTORY  07/01/2019    cysto dr moreno    OTHER SURGICAL HISTORY  01/12/2022    cystoscopy-     PATIENT  DOCUMENTED NOT TO HAVE EXPERIENCED ANY OF THE FOLLOWING EVENTS  8/22/2013    Procedure: HIP INJECTION (PAIN);  Surgeon: Julián Suarez MD;  Location: [facility]    PATIENT WITHOUGH PREOPERATIVE ORDER FOR IV ANTIBIOTIC SURGICAL SITE INFECTION PROPHYLAXIS.  8/22/2013    Procedure: HIP INJECTION (PAIN);  Surgeon: Julián Suarez MD;  Location: [facility]    TONSILLECTOMY          ALL:  Allergies   Allergen Reactions    Levaquin [Levofloxacin] PAIN     Tendonitis of the Achilles tendons mild    Adhesive Tape OTHER (SEE COMMENTS)     Welts    Neosporin Original [Neomycin-Bacitracin-Polymyxin] UNKNOWN     Pt reports she was not sure of the reaction, it was 15 yrs ago.  Tolerates polysporin    Sulfa Antibiotics UNKNOWN     \"Did not feel good.\" Daughters do not believe patient is allergic. Bactrim taken many times according to med history.        Home Medications:  Outpatient Medications Marked as Taking for the 1/23/24 encounter (Hospital Encounter)   Medication Sig Dispense Refill    furosemide 40 MG Oral Tab Take 1 tablet (40 mg total) by mouth daily.      methenamine 1 g Oral Tab Take 1 tablet (1 g total) by mouth 2 (two) times daily.      spironolactone 25 MG Oral Tab Take 0.5 tablets (12.5 mg total) by mouth daily.      calcium carbonate 500 MG Oral Chew Tab Chew 1 tablet (500 mg total) by mouth daily.      ibuprofen 200 MG Oral Tab Take 1 tablet (200 mg total) by mouth every 6 (six) hours as needed for Pain.      magnesium hydroxide (MILK OF MAGNESIA) 400 MG/5ML Oral Suspension Take 30 mL by mouth daily as needed for constipation.      ALPRAZolam 0.25 MG Oral Tab Take 1 tablet (0.25 mg total) by mouth nightly as needed for Anxiety.           Soc Hx  Social History     Tobacco Use    Smoking status: Never    Smokeless tobacco: Never   Substance Use Topics    Alcohol use: Yes     Alcohol/week: 5.0 standard drinks of alcohol     Types: 3 Glasses of wine, 2 Shots of liquor per  week        Fam Hx  Family History   Problem Relation Age of Onset    Cancer Father         prostate and throat    Heart Disorder Mother     Heart Attack Mother     Other (Parkinson's Dz) Mother 57       Review of Systems  Comprehensive ROS reviewed and negative except for what's stated above.         OBJECTIVE:  BP (!) 178/46   Pulse (!) 38   Temp 98.5 °F (36.9 °C) (Temporal)   Resp 16   Ht 5' 1\" (1.549 m)   Wt 147 lb (66.7 kg)   SpO2 96%   BMI 27.78 kg/m²   General:  Alert, no distress, appears stated age.   Head:  Normocephalic, without obvious abnormality, atraumatic.   Eyes:  Sclera anicteric, No conjunctival pallor, EOMs intact.    Throat: dry   Neck: Supple,    Lungs:   Clear to auscultation bilaterally. Normal effort   Chest wall:  No tenderness or deformity.   Heart:  bradycardic   Abdomen:   Soft, NT/ND    Extremities: Atraumatic trace edema   Skin: No visible rashes or lesions.    Neurologic: Normal strength, no focal deficit appreciated     Diagnostic Data:    CBC/Chem  Recent Labs   Lab 01/23/24  1539   WBC 6.7   HGB 12.2   MCV 99.5   .0   INR 0.96       Recent Labs   Lab 01/23/24  1539      K 3.7      CO2 30.0   BUN 30*   CREATSERUM 1.12*   GLU 93   CA 9.2   MG 2.3       Recent Labs   Lab 01/23/24  1539   AST 18   ALB 3.8       No results for input(s): \"TROP\" in the last 168 hours.    Additional Diagnostics: ECG: bradycardic    CXR: image personally reviewed     Radiology: XR CHEST AP PORTABLE  (CPT=71045)    Result Date: 1/23/2024  PROCEDURE:  XR CHEST AP PORTABLE  (CPT=71045)  TECHNIQUE:  AP chest radiograph was obtained.  COMPARISON:  08/10/2022  INDICATIONS:  2nd degree Heart Block-from [facility]  PATIENT STATED HISTORY: (As transcribed by Technologist)  Pt. with 2nd degree Heart Block-from [facility]                 CONCLUSION:   Stable cardiac and mediastinal contours.  Stable prominence of the pulmonary vasculature.  Mild reticular scarring atelectasis of the  right lung base without pulmonary edema or acute airspace disease.  The pleural spaces are clear.   LOCATION:  [place]      Dictated by (CST): Hugo Lopez MD on 1/23/2024 at 4:34 PM     Finalized by (CST): Hugo Lopez MD on 1/23/2024 at 4:35 PM          ASSESSMENT / PLAN:     Patient is a 83 year old female with PMH sig for htn, chf, dementia per family, here for low HR    Impression    -bradycardia / 2nd degree AVB  -chronic CHF   -HTN  -dementia    Plan    *CV  -current HR in low 40s, pt asymptomatic  Tsh ok   Echo pending  EP eval   Tele    *chronic CHF  -cont laisx / aldactone per cards  Does not appear grossly overloaded    *HTN  -on lisinopril, conitnued    Scds        Further recommendations pending patient's clinical course. [facility] Hospitalist to continue to follow patient while in house    Patient and/or patient's family given opportunity to ask questions and note understanding and agreeing with therapeutic plan as outlined    Kvng Portillo Hospitalist  [phone]  Answering Service: [phone]        Electronically signed by Kvng Plummer DO on 1/23/2024  8:43 PM              Consults - MD Consult Notes      Consults filed by Tyree Carlton MD at 1/25/2024  6:57 PM / Draft: Not Electronically Signed     Author: Tryee Carlton MD Service: Cardiology Author Type: Physician    Filed: 1/25/2024  6:57 PM Status: Unsigned Transcription    : Tyree Carlton MD (Physician)       [facility]    PATIENT'S NAME: SHARON MCCOLLUM   ATTENDING PHYSICIAN: Kapil Ray MD   CONSULTING PHYSICIAN: Tyree Carlton M.D.   PATIENT ACCOUNT#:   [id number]    LOCATION:  [address] [place] [address]  MEDICAL RECORD #:   [id number]       YOB: 1940  ADMISSION DATE:       01/23/2024      CONSULT DATE:  01/25/2024    REPORT OF CONSULTATION    ELECTROPHYSIOLOGY CONSULT    HISTORY OF PRESENT ILLNESS:  This is an 83-year-old patient we are asked to see for pacemaker therapy in light of her 2:heart  block.  Patient suffers from dementia.  Some of the history was obtained from the patient's daughters at the bedside.  She resides in a nursing facility and was found to be bradycardic.  With her dementia, symptoms are challenging, but she does admit to some dizziness.  She denies chest pain, heaviness in her chest, or shortness of breath.  The family is unaware of other cardiac history, specifically denying MI, congestive heart failure, rheumatic fever, daniel syncope, leg edema, or orthopnea.    Her presenting EKG was sinus rhythm with 2:1 heart block with underlying right bundle-branch block.    CARDIAC RISK FACTORS:  Negative for tobacco.  She has hypertension but no dyslipidemia or diabetes.    PAST MEDICAL HISTORY:  Hypertension.  Migraines.  GERD.  Dementia.  Bilateral hip replacements.  Herniorrhaphy.  Tonsillectomy.    MEDICATIONS:  Current medications are Lasix, Aldactone, ibuprofen, alprazolam, lisinopril.    ALLERGIES:  Levaquin, adhesive, Neosporin, sulfa antibiotics.    SOCIAL HISTORY:  Patient is living in a nursing home.  No significant alcohol or excessive caffeine intake.  Daughters are involved in her care.    FAMILY HISTORY:  Negative for premature CAD.    REVIEW OF SYSTEMS:  Obtained, but limited due to patient's dementia.  No history of thyroid disease and recent TSH was normal.      PHYSICAL EXAMINATION:    GENERAL:  She is a pleasant lady who struggles with facts, in no distress.   VITAL SIGNS:  Blood pressure is 138/76, pulse is 34.   HEENT:  Head is normocephalic.  Eyes are nonicteric.  Oral mucosa is moist.  NECK:  No JVD or carotid bruits.  Thyroid is small.  LUNGS:  Clear.  HEART:  A bradycardic rhythm.  No significant murmurs or extra heart sounds.  Left chest is without lesions or deformities.  ABDOMEN:  Soft and benign.  EXTREMITIES:  No edema or gross musculoskeletal defects.  NEUROLOGIC:  She is alert, confused.  PSYCHIATRIC:  Noncombative.    LABORATORY DATA:  Reviewed in the HPI.   Additional laboratory data include hemoglobin of 11, white count is normal.  Electrolytes are normal.  Creatinine is normal.      Chest x-ray is no acute disease.      Echocardiogram from yesterday has an EF of 60% to 65%, no significant valvular heart disease.    IMPRESSION:    1.   2:1 heart block with baseline right bundle-branch block.  2.   Dizziness.  3.   Hypertension.  4.   Dementia.    PLAN:    1.   Reviewed with patient and daughters at the bedside the role of a permanent pacemaker and nature of implant.  Alternative was reviewed, which was no pacemaker.  Risks of the procedure including bleeding, infection, collapsed lung, lead dislodgement were reviewed.  Questions were answered.  2.   Blood pressure is controlled.  No change in therapy.  3.   Continue telemetry.     Dictated By Tyree Carlton M.D.  d: 01/25/2024 18:08:28  t: 01/25/2024 18:45:47  Job [id number]  MON/             D/C Summary    No notes of this type exist for this encounter.        Physical Therapy Notes (last 72 hours)      Physical Therapy Note signed by Rocio Tavares PT at 1/26/2024  4:24 PM  Version 1 of 1    Author: Rocio Tavares PT Service: Rehab Author Type: Physical Therapist    Filed: 1/26/2024  4:24 PM Date of Service: 1/26/2024  8:30 AM Status: Signed    : Rocio Taavres PT (Physical Therapist)             PHYSICAL THERAPY EVALUATION - INPATIENT     Room Number: 8611/8611-A  Evaluation Date: 1/26/2024  Type of Evaluation: Initial  Physician Order: PT Eval and Treat    Presenting Problem: bradycardia, s/p ppm 1/25  Co-Morbidities : HTN, CHF, dementia  Reason for Therapy: Mobility Dysfunction and Discharge Planning    History related to current admission: Patient is a 83 year old female admitted on 1/23/2024 from home for bradycardia.  Pt s/p ppm placement 1/25/24.     ASSESSMENT   In this PT evaluation, the patient presents with the following impairments limited endurance, L UE pain, balance  impairments, and decreased activity tolerance.  These impairments and comorbidities manifest themselves as functional limitations in independent bed mobility, transfers, and gait.  The patient is below baseline and would benefit from skilled inpatient PT to address the above deficits to assist patient in returning to prior to level of function.   Functional outcome measures completed include AMPA.  The AM-PAC '6-Clicks' Inpatient Basic Mobility Short Form was completed and this patient is demonstrating a Approx Degree of Impairment: 46.58%  degree of impairment in mobility. Research supports that patients with this level of impairment may benefit from HHPT.  DISCHARGE RECOMMENDATIONS  PT Discharge Recommendations: Home with home health PT    PLAN  PT Treatment Plan: Bed mobility;Endurance;Energy conservation;Patient education;Balance training;Stair training;Strengthening;Gait training  Rehab Potential : Good  Frequency (Obs): 3-5x/week  Number of Visits to Meet Established Goals: 3      CURRENT GOALS    Goal #1 Patient is able to demonstrate supine - sit EOB @ level: supervision     Goal #2 Patient is able to demonstrate transfers EOB to/from BSC at assistance level: supervision     Goal #3 Patient is able to ambulate 150 feet with assist device: walker - rolling at assistance level: supervision     Goal #4    Goal #5    Goal #6    Goal Comments: Goals established on 1/26/2024    HOME SITUATION  Type of Home: Assisted living facility   Home Layout: One level                Lives With: Staff 24 hours  Drives: No  Patient Owned Equipment: Rolling walker       Prior Level of [place]: Pt lives at [facility]. Pt ambulates with RW. Pt has occasional assist from staff for LBD.     SUBJECTIVE  \"My arm does hurt.\"       OBJECTIVE  Precautions: Hard of hearing;Bed/chair alarm  Fall Risk: High fall risk    WEIGHT BEARING RESTRICTION  Weight Bearing Restriction: None                PAIN ASSESSMENT  Rating:  Unable to rate  Location: L shoulder, ppm site  Management Techniques: Repositioning    COGNITION  Memory:  decreased recall of recent events and decreased short term memory  Following Commands:  follows one step commands consistently  Initiation: cues to initiate tasks  Safety Judgement:  decreased awareness of need for assistance    RANGE OF MOTION AND STRENGTH ASSESSMENT  Upper extremity ROM and strength are within functional limits     Lower extremity ROM is within functional limits     Lower extremity strength is within functional limits       BALANCE  Static Sitting: Fair +  Dynamic Sitting: Fair  Static Standing: Poor +  Dynamic Standing: Poor +    ADDITIONAL TESTS                                    ACTIVITY TOLERANCE                         O2 WALK       NEUROLOGICAL FINDINGS                        AM-PAC '6-Clicks' INPATIENT SHORT FORM - BASIC MOBILITY  How much difficulty does the patient currently have...  Patient Difficulty: Turning over in bed (including adjusting bedclothes, sheets and blankets)?: A Little   Patient Difficulty: Sitting down on and standing up from a chair with arms (e.g., wheelchair, bedside commode, etc.): A Little   Patient Difficulty: Moving from lying on back to sitting on the side of the bed?: A Little   How much help from another person does the patient currently need...   Help from Another: Moving to and from a bed to a chair (including a wheelchair)?: A Little   Help from Another: Need to walk in hospital room?: A Little   Help from Another: Climbing 3-5 steps with a railing?: A Little       AM-PAC Score:  Raw Score: 18   Approx Degree of Impairment: 46.58%   Standardized Score (AM-PAC Scale): 43.63   CMS Modifier (G-Code): CK    FUNCTIONAL ABILITY STATUS  Gait Assessment   Functional Mobility/Gait Assessment  Gait Assistance: Contact guard assist  Distance (ft): 200  Assistive Device: Rolling walker  Pattern:  (slow sandra)    Skilled Therapy Provided     Bed  Mobility:  Rolling: MIN  Supine to sit: MIN   Sit to supine: NT     Transfer Mobility:  Sit to stand: CGA   Stand to sit: CGA  Gait = CGA     Gait training:  Pt ambulates with slow sandra, excessive kyphosis, and poor foot clearance.   VC for posture and sequencing with RW.   No LOB noted.     Therapist's Comments: Reviewed ppm precautions and activity recommendations including RW and sling use.     Exercise/Education Provided:  Bed mobility  Energy conservation  Functional activity tolerated  Gait training  Posture  Strengthening  Transfer training    Patient End of Session: Up in chair;Needs met;Call light within reach;RN aware of session/findings;All patient questions and concerns addressed      Patient Evaluation Complexity Level:  History Moderate - 1 or 2 personal factors and/or co-morbidities   Examination of body systems Moderate - addressing a total of 3 or more elements   Clinical Presentation Low - Stable   Clinical Decision Making Low - Stable       PT Session Time: 25 minutes  Gait Training: 10 minutes               Physical Therapy Note signed by Rocio Tavares PT at 1/24/2024  4:27 PM  Version 1 of 1    Author: Rocio Tavares PT Service: Rehab Author Type: Physical Therapist    Filed: 1/24/2024  4:27 PM Date of Service: 1/24/2024  4:18 PM Status: Signed    : Rocio Tavares PT (Physical Therapist)       Order received for PT eval and chart reviewed. Pt presenting with bradycardia. Plan for pacemaker placement 1/25/24. PT will follow up post op.              Occupational Therapy Notes (last 72 hours)  Notes from 1/23/2024  4:52 PM through 1/26/2024  4:52 PM   No notes of this type exist for this encounter.     Video Swallow Study Notes    No notes of this type exist for this encounter.     SLP Notes    No notes of this type exist for this encounter.     Immunizations     Name Date      Ceftriaxone 250 Mg 08/13/15     TDAP 06/06/17       Multidisciplinary Problems     Active Goals         Problem: Patient/Family Goals    Goal Priority Disciplines Outcome Interventions   Patient/Family Long Term Goal     Interdisciplinary Progressing    Description: Patient's Long Term Goal: \"go home\"     Interventions:  - medications as ordered by physician  - testing as ordered by physician   - See additional Care Plan goals for specific interventions   Patient/Family Short Term Goal     Interdisciplinary Progressing    Description: Patient's Short Term Goal: \"maintain adequate HR\"     Interventions:   - medications as ordered by physician   - testing as ordered by physician   - see EP   - see cardiology   - See additional Care Plan goals for specific interventions

## (undated) NOTE — IP AVS SNAPSHOT
Patient Demographics     Address  [address]   [place] [address] Phone  [phone] (Home)  [phone] (Mobile) *Preferred* E-mail Address  [email or web address]      Patient Contacts     Name Relation Home Work Mobile    Earlene Ruiz Power of    [phone]    Neeta Figueredo Daughter   [phone]      Allergies as of 1/29/2024  Review status set to In Progress on 1/27/2024       Noted Reaction Type Reactions    DELETED: Benadryl [diphenhydramine Hcl] 07/30/2013    DIZZINESS, FATIGUE    sleepy    DELETED: Levaquin [levofloxacin] 05/05/2021        DELETED: Bactrim [sulfamethoxazole W/trimethoprim] 09/14/2016    UNKNOWN    DELETED: Nitrofurantoin 10/13/2016    DIZZINESS, FATIGUE    Pounding heart.    Adhesive Tape 08/10/2022   Intolerance OTHER (SEE COMMENTS)    Welts    DELETED: Keflex [cephalexin] 08/19/2015    NAUSEA ONLY    Weakness  Pt states should not be listed as allergy    DELETED: Neosporin Original [neomycin-bacitracin-polymyxin] 06/06/2017    UNKNOWN    Pt reports she was not sure of the reaction, it was 15 yrs ago.  Tolerates polysporin    Sulfa Antibiotics 06/06/2017   Intolerance UNKNOWN    \"Did not feel good.\" Daughters do not believe patient is allergic. Bactrim taken many times according to med history.      Code Status Information     Code Status    DNAR/Selective Treatment        Patient Instructions       Reduce lasix to 20 mg daily.   Continue rest of home medications  Repeat BMP (to check kidney function) later this week      Follow-up Information     Brendan Love MD. Schedule an appointment as soon as possible for a visit in 1 week(s).    Specialty: Family Medicine  Contact information:  [address] [place] [address] [phone]                        Your Home Meds List      TAKE these medications       Instructions Authorizing Provider Morning Afternoon Evening As Needed   acetaminophen 325 MG Tabs  Commonly known as: Tylenol      Take 2 tablets (650 mg total)  by mouth every 6 (six) hours as needed for Pain.          ALPRAZolam 0.25 MG Tabs  Commonly known as: Xanax      Take 1 tablet (0.25 mg total) by mouth nightly as needed for Anxiety.          calcium carbonate 500 MG Chew  Commonly known as: Tums      Chew 1 tablet (500 mg total) by mouth daily.          furosemide 40 MG Tabs  Commonly known as: Lasix      Take 0.5 tablets (20 mg total) by mouth daily.   Kvng Zlatarov         lisinopril 40 MG Tabs  Commonly known as: Prinivil; Zestril      Take 1 tablet (40 mg total) by mouth daily.          methenamine 1 g Tabs  Commonly known as: Hiprex      Take 1 tablet (1 g total) by mouth 2 (two) times daily.          Milk of Magnesia 400 MG/5ML Susp  Generic drug: magnesium hydroxide      Take 30 mL by mouth daily as needed for constipation.          senna-docusate 8.6-50 MG Tabs  Commonly known as: Senokot-S      Take 2 tablets by mouth 2 (two) times daily as needed for constipation.          spironolactone 25 MG Tabs  Commonly known as: Aldactone      Take 0.5 tablets (12.5 mg total) by mouth daily.                   8825-1115-A - MAR ACTION REPORT  (last 48 hrs)    ** SITE UNKNOWN **     Order ID Medication Name Action Time Action Reason Comments    [id number] lisinopril (Prinivil; Zestril) tab 40 mg 01/29/24 1357 Given      [id number] sodium chloride 0.9 % IV bolus 500 mL (Followed by Linked Group #1) 01/27/24 2153 New Bag      [id number] sodium chloride 0.9 % IV bolus 500 mL 01/27/24 2320 New Bag            LEFT LOWER ABDOMEN     Order ID Medication Name Action Time Action Reason Comments    [id number] heparin (Porcine) 5000 UNIT/ML injection 5,000 Units 01/28/24 0821 Given      [id number] heparin (Porcine) 5000 UNIT/ML injection 5,000 Units 01/28/24 1339 Given      [id number] heparin (Porcine) 5000 UNIT/ML injection 5,000 Units 01/28/24 2100 Given            RIGHT LOWER ABDOMEN     Order ID Medication Name Action Time Action Reason Comments    [id number] heparin (Porcine)  5000 UNIT/ML injection 5,000 Units 01/29/24 1400 Given              Recent Vital Signs    Flowsheet Row Most Recent Value   /57 Filed at 01/29/2024 1200   Pulse 77 Filed at 01/29/2024 0402   Resp 18 Filed at 01/29/2024 1200   Temp 98.2 °F (36.8 °C) Filed at 01/29/2024 1200   SpO2 100 % Filed at 01/29/2024 0402      Patient's Most Recent Weight    Flowsheet Row Most Recent Value   Patient Weight 61.2 kg (135 lb)         Lab Results Last 24 Hours      Basic Metabolic Panel (8) [[id number]] (Abnormal)  Resulted: 01/29/24 0944, Result status: Final result   Ordering provider: Azael Forde MD  01/28/24 2300 Resulting lab: [facility] ([facility])    Specimen Information    Type Source Collected On   Blood — 01/29/24 0853          Components    Component Value Reference Range Flag Lab   Glucose 112 70 - 99 mg/dL [facility] ([facility])   Sodium 144 136 - 145 mmol/L — [facility] ([facility])   Potassium 4.3 3.5 - 5.1 mmol/L — [facility] ([facility])   Chloride 115 98 - 112 mmol/L [facility] ([facility])   CO2 26.0 21.0 - 32.0 mmol/L — [facility] ([facility])   Anion Gap 3 0 - 18 mmol/L — [facility] ([facility])   BUN 23 9 - 23 mg/dL — [facility] ([facility])   Creatinine 0.64 0.55 - 1.02 mg/dL — [facility] ([facility])   Calcium, Total 8.8 8.5 - 10.1 mg/dL — [facility] ([facility])   Calculated Osmolality 302 275 - 295 mOsm/kg [facility] ([facility])   eGFR-Cr 88 >=60 mL/min/1.73m2 — [facility] ([facility])            CBC With Differential With Platelet [[id number]] (Abnormal)  Resulted: 01/29/24 0919, Result status: Final result   Ordering provider: Azael Forde MD  01/28/24 2300 Resulting lab: [facility] ([facility])   Narrative:  The following orders were created for panel order CBC With Differential With Platelet.  Procedure                               Abnormality         Status                     ---------                               -----------         ------                     CBC W/ DIFFERENTIAL[[id number]]          Abnormal             Final result                 Please view results for these tests on the individual orders.    Specimen Information    Type Source Collected On   Blood — 24 0853            Testing Performed By     Lab - Abbreviation Name Director Address Valid Date Range    139 - [facility] (On license of [facility]) [facility] ([facility]) Goldberg, Cathryn A. MD [address] [place] [phone] 20 1441 - Present            Microbiology Results (All)     Procedure Component Value Units Date/Time    Urine Culture, Routine [[id number]]  (Abnormal) Collected: 24 2325    Order Status: Completed Lab Status: Preliminary result Updated: 24 1019    Specimen: Urine, clean catch      Urine Culture 10,000 - 50,000 CFU/ML Pseudomonas aeruginosa    Urine Culture, Routine [[id number]]     Order Status: Sent Lab Status: No result     Specimen: Urine, zhao catheter     Rapid SARS-CoV-2 by PCR [[id number]]  (Normal) Collected: 24 2249    Order Status: Completed Lab Status: Final result Updated: 24 2315    Specimen: Other from Nares      Rapid SARS-CoV-2 by PCR Not Detected      Pending Labs     Order Current Status    Urine Culture, Routine Preliminary result         H&P - H&P Note      H&P signed by Azael Forde MD at 2024 12:19 PM  Version 1 of 1    Author: Azael Forde MD Service: Hospitalist Author Type: Physician    Filed: 2024 12:19 PM Date of Service: 2024  8:26 AM Status: Signed    : Azael Forde MD (Physician)         [facility]    History and Physical     Evelyn Reeves Patient Status:  Inpatient    1940 MRN [id number]   Location [facility] 3NE-A Attending Azael Forde MD   Hosp Day # 1 PCP Brendan Love MD     Chief Complaint:   Chief Complaint   Patient presents with    Cath Tube Problem         History of Present Illness: Evelyn Reeves is a 83 year old female with CHF, chronic indwelling Zaho, secondary AV block s/p  PPM placement, discharged home yesterday who presented to the ED from her nursing home for evaluation of lack of urine output.  Patient is a poor historian but denies complaints, patient's family reportedly said that her oral intake has been decreased.    On arrival to the ED she was afebrile and hemodynamically stable, saturating 97% on room air.  Labs were notable for creatinine 2.9, otherwise unremarkable from day of discharge.  Her Cooper catheter was exchanged with \"a small amount of cloudy light yellow urine drained from the bladder.\" UA 11-20 WBCs, negative nitrtites, no bacteria. She was started on IV fluids and nephrology was consulted.    Overnight following mission there are no acute events and she remained afebrile and hemodynamically stable.  This morning her creatinine has improved to 1.7 and she denies any new or worsening symptoms.     Past Medical History:  Past Medical History:   Diagnosis Date    Abdominal hernia     Abdominal pain     Anemia     Anxiety     Arthritis     Atypical mole     Back pain     Bad breath     Bloating     Blurred vision     Congestive heart disease (HCC)     Constipation     Depressed state     never required treatment    Depression     Diarrhea, unspecified     Easy bruising     Enlarged lymph node     Esophageal reflux     Flatulence/gas pain/belching     Frequent use of laxatives     Hearing impairment     Hearing loss     Hemorrhoids     High blood pressure     History of cardiac murmur     Hoarseness, chronic     per patient from Allergies    Incontinence     Indigestion     Irregular bowel habits     Migraine headache     Nausea     Night sweats     Osteoarthritis     Osteoporosis     Pain in joints     Pain with bowel movements     Painful urination     Presence of other cardiac implants and grafts     2 hip replacements    Stress     Uncomfortable fullness after meals     Urinary retention     Weight loss         Past Surgical History:   Past Surgical History:    Procedure Laterality Date    BENIGN BIOPSY RIGHT      CATARACT      COLONOSCOPY N/A 04/08/2021    Procedure: colonoscopy;  Surgeon: Reagan Watt MD;  Location:  ENDOSCOPY    COLONOSCOPY      DRAIN/INJECT LARGE JOINT/BURSA  08/22/2013    Procedure: HIP INJECTION (PAIN);  Surgeon: Julián Suarez MD;  Location: [facility]    FLUOROSCOPIC GUIDANCE NEEDLE PLACEMENT  08/22/2013    Procedure: HIP INJECTION (PAIN);  Surgeon: Julián Suarez MD;  Location: [facility] PAIN MANAGEMENT    HIP REPLACEMENT SURGERY      bilateral   Dr turner  right 2015  left 2016     OTHER      R THR    OTHER SURGICAL HISTORY      hernia 1968    OTHER SURGICAL HISTORY      breast biopsy 1980    OTHER SURGICAL HISTORY  04/25/2014    flow [facility]- Dr. Ron    OTHER SURGICAL HISTORY  07/01/2019    cysto dr moreno    OTHER SURGICAL HISTORY  01/12/2022    cystoscopy-     PATIENT DOCUMENTED NOT TO HAVE EXPERIENCED ANY OF THE FOLLOWING EVENTS  08/22/2013    Procedure: HIP INJECTION (PAIN);  Surgeon: Julián Suarez MD;  Location: [facility] PAIN MANAGEMENT    PATIENT WITHOUGH PREOPERATIVE ORDER FOR IV ANTIBIOTIC SURGICAL SITE INFECTION PROPHYLAXIS.  08/22/2013    Procedure: HIP INJECTION (PAIN);  Surgeon: Julián Suarez MD;  Location: [facility] PAIN MANAGEMENT    TONSILLECTOMY      TOTAL HIP REPLACEMENT         Social History:  reports that she has never smoked. She has never been exposed to tobacco smoke. She has never used smokeless tobacco. She reports current alcohol use of about 5.0 standard drinks of alcohol per week. She reports that she does not use drugs.    Family History:   Family History   Problem Relation Age of Onset    Cancer Father         prostate and throat    Heart Disorder Mother     Heart Attack Mother     Other (Parkinson's Dz) Mother 57       Allergies:   Allergies   Allergen Reactions    Adhesive Tape OTHER (SEE COMMENTS)     Welts    Sulfa Antibiotics UNKNOWN     \"Did not feel  good.\" Daughters do not believe patient is allergic. Bactrim taken many times according to med history.       Medications:    Current Facility-Administered Medications on File Prior to Encounter   Medication Dose Route Frequency Provider Last Rate Last Admin    [COMPLETED] lidocaine PF (Xylocaine-MPF) 1 % injection             [COMPLETED] fentaNYL (Sublimaze) 50 mcg/mL injection             [COMPLETED] midazolam (Versed) 2 MG/2ML injection             [COMPLETED] vancomycin (Vancocin) 1 g injection             [COMPLETED] ceFAZolin (Ancef) 2 g/20mL IV syringe premix             [COMPLETED] diphenhydrAMINE (Benadryl) 50 mg/mL  injection             [COMPLETED] ceFAZolin (Ancef) 2 g in 20mL IV syringe premix  2 g Intravenous Q8H Tyree Carlton MD   2 g at 24 0906    [COMPLETED] chlorhexidine (Hibiclens) 4 % external liquid 30 mL  30 mL Topical On Call Genet Farr APRN   30 mL at 24 0630    [] sodium chloride 0.9% infusion   Intravenous On Call Genet Farr APRN        [COMPLETED] ceFAZolin (Ancef) 2 g in 20mL IV syringe premix  2 g Intravenous 30 Min Pre-Op Genet Farr APRN   2 g at 24 1715     Current Outpatient Medications on File Prior to Encounter   Medication Sig Dispense Refill    furosemide 40 MG Oral Tab Take 1 tablet (40 mg total) by mouth daily.      methenamine 1 g Oral Tab Take 1 tablet (1 g total) by mouth 2 (two) times daily.      spironolactone 25 MG Oral Tab Take 0.5 tablets (12.5 mg total) by mouth daily.      calcium carbonate 500 MG Oral Chew Tab Chew 1 tablet (500 mg total) by mouth daily.      magnesium hydroxide (MILK OF MAGNESIA) 400 MG/5ML Oral Suspension Take 30 mL by mouth daily as needed for constipation.      ALPRAZolam 0.25 MG Oral Tab Take 1 tablet (0.25 mg total) by mouth nightly as needed for Anxiety.      lisinopril 40 MG Oral Tab Take 1 tablet (40 mg total) by mouth daily.      acetaminophen 325 MG Oral Tab Take 2 tablets (650 mg total) by  mouth every 6 (six) hours as needed for Pain.      senna-docusate 8.6-50 MG Oral Tab Take 2 tablets by mouth 2 (two) times daily as needed for constipation.         Review of Systems:   A comprehensive 14 point review of systems was completed.    Pertinent positives and negatives noted in the HPI.    Physical Exam:    /78 (BP Location: Right arm)   Pulse 73   Temp 97.3 °F (36.3 °C) (Oral)   Resp 18   Wt 135 lb (61.2 kg)   SpO2 96%   BMI 25.51 kg/m²     General: No acute distress. Comfortable, nontoxic   HEENT: Normocephalic atraumatic. Moist mucous membranes; Sclera anicteric.  Neck: Supple, no JVD  Respiratory: Clear to auscultation bilaterally. Reg resp rate & effort, no wheezes/crackles   Cardiovascular: S1, S2. Regular rate and rhythm. No murmurs appreciable   Chest and Back: No tenderness or deformity.  Abdomen: Soft, nontender, nondistended.  Positive bowel sounds. No rebound, guarding or organomegaly.  Neurologic: No focal neurological deficits. CNII-XII grossly intact.  Musculoskeletal: Moves all extremities.  Extremities: No edema or cyanosis.  Skin/Lines: No rashes or lesions. IFC draining clear yellow urine   Psychiatric: Appropriate mood and affect.      Diagnostic Data:      Labs:  Recent Labs   Lab 01/23/24  1539 01/24/24  0542 01/25/24  0514 01/26/24  0453 01/27/24 2135 01/28/24  0631   WBC 6.7 5.5 5.6 8.2 9.0 6.4   HGB 12.2 10.8* 11.5* 12.0 11.7* 10.5*   MCV 99.5 99.7 100.0 98.3 100.6* 102.5*   .0 213.0 211.0 214.0 200.0 161.0   INR 0.96  --   --   --   --   --        Recent Labs   Lab 01/23/24  1539 01/24/24  0542 01/26/24  0453 01/27/24  2135 01/28/24  0633   GLU 93   < > 106* 107* 108*   BUN 30*   < > 32* 61* 51*   CREATSERUM 1.12*   < > 0.88 2.86* 1.73*   CA 9.2   < > 9.6 9.2 8.6   ALB 3.8  --   --  3.5  --       < > 140 136 142   K 3.7   < > 3.8 4.2 3.9      < > 109 103 111   CO2 30.0   < > 28.0 29.0 26.0   ALKPHO 87  --   --  83  --    AST 18  --   --  19  --     BILT 0.3  --   --  0.4  --    TP 7.0  --   --  6.6  --     < > = values in this interval not displayed.       Estimated Creatinine Clearance: 18.9 mL/min (A) (based on SCr of 1.73 mg/dL (H)).    Recent Labs   Lab 01/23/24  1539   PTP 12.7   INR 0.96       Recent Labs   Lab 01/27/24  2135          Imaging: Imaging data reviewed in Epic.      ASSESSMENT / PLAN:     Evelyn Reeves Is a a 83 year old female who presents with LEONARD and indwelling catheter malfucntion    Problem List / Diagnoses    LEONARD  Chronic Indwelling Cooper Catheter  Abnormal UA  Anxiety  HTN     Plan    LEONARD  -- Cr 2.9 on admission, baseline normal   -- likely secondary to malfunctioning IFC however they may be a component of dehydration   -- Renal US unremarkable   -- improving to 1.7 following fluids continue  -- strict I/Os  -- renally dose meds, avoid nephrotoxic agents   -- nephrology consulted, will f/u recommendations     Chronic Indwelling Cooper Catheter  Abnormal UA  -- UA not suggestive of UTI, no fevers, no leukocytosis, Cr improving with ivf  -- defer antibiotics at this time, CTM, will follow urine cultures   -- hold home methenamine for now given LEONARD     Anxiety  -- resume home alprazolam prn    HTN  -- holding lisinopril/furosemide/spironolactone given LEONARD   -- BP currently well controlled     DVT Mechanical Prophylaxis:   SCDs,    DVT Pharmacologic Prophylaxis   Medication    heparin (Porcine) 5000 UNIT/ML injection 5,000 Units              Code Status: DNAR/Selective Treatment    Dispo: inpatient; [place] 1-2 days pending continued clinical improvement, nephrology eval     Plan of care discussed with patient and/or family at bedside.    NADEGE Forde MD  [facility]   [phone]          Electronically signed by Azael Forde MD on 1/28/2024 12:19 PM              Consults - MD Consult Notes      Consults signed by Gisele More MD at 1/28/2024  3:59 PM     Author: Gisele More MD Service: Nephrology Author  Type: Physician    Filed: 2024  3:59 PM Date of Service: 2024  3:55 PM Status: Signed    : Gisele More MD (Physician)     Consult Orders    1. ED Consult to Nephrology [[id number]] ordered by Marcy Holcomb MD at 24 [facility]             [facility]    Report of Consultation    Evelyn Reeves Patient Status:  Inpatient    1940 MRN [id number]   Location [facility] 3NE-A Attending Azael Forde MD   Hosp Day # 1 PCP Brendan Love MD       REASON FOR CONSULT:     LEONARD    HISTORY OF PRESENT ILLNESS:     82 yo F with recent admission for PPM placement,, diastolic CHF, HTN, dementia and chronic indwelling zhao presented with zhao not draining x 1 day associated with LEONARD with creatinine up to 2.86 mg/dl from baseline of 0.8-0.9 mg/dl. Zhao was exchanged and now draining well. Also on lasix and spironolactone for CHF. Started on IV fluids. Denies n/v/d. Eating well - just had [employer] for lunch. Denies NSAID use.    REVIEW OF SYSTEMS:     Please see HPI for pertinent positives. 10 point review of systems otherwise reviewed and negative.     HISTORY:     Past Medical History:   Diagnosis Date    Abdominal hernia     Abdominal pain     Anemia     Anxiety     Arthritis     Atypical mole     Back pain     Bad breath     Bloating     Blurred vision     Congestive heart disease (HCC)     Constipation     Depressed state     never required treatment    Depression     Diarrhea, unspecified     Easy bruising     Enlarged lymph node     Esophageal reflux     Flatulence/gas pain/belching     Frequent use of laxatives     Hearing impairment     Hearing loss     Hemorrhoids     High blood pressure     History of cardiac murmur     Hoarseness, chronic     per patient from Allergies    Incontinence     Indigestion     Irregular bowel habits     Migraine headache     Nausea     Night sweats     Osteoarthritis     Osteoporosis     Pain in joints     Pain with bowel movements     Painful  urination     Presence of other cardiac implants and grafts     2 hip replacements    Stress     Uncomfortable fullness after meals     Urinary retention     Weight loss      Past Surgical History:   Procedure Laterality Date    BENIGN BIOPSY RIGHT      CATARACT      COLONOSCOPY N/A 04/08/2021    Procedure: colonoscopy;  Surgeon: Reagan Watt MD;  Location:  ENDOSCOPY    COLONOSCOPY      DRAIN/INJECT LARGE JOINT/BURSA  08/22/2013    Procedure: HIP INJECTION (PAIN);  Surgeon: Julián Suarez MD;  Location: [facility]    FLUOROSCOPIC GUIDANCE NEEDLE PLACEMENT  08/22/2013    Procedure: HIP INJECTION (PAIN);  Surgeon: Julián Suarez MD;  Location: [facility] PAIN MANAGEMENT    HIP REPLACEMENT SURGERY      bilateral   Dr turner  right 2015  left 2016     OTHER      R THR    OTHER SURGICAL HISTORY      hernia 1968    OTHER SURGICAL HISTORY      breast biopsy 1980    OTHER SURGICAL HISTORY  04/25/2014    flow us- Dr. Ron    OTHER SURGICAL HISTORY  07/01/2019    cysto dr moreno    OTHER SURGICAL HISTORY  01/12/2022    cystoscopy-     PATIENT DOCUMENTED NOT TO HAVE EXPERIENCED ANY OF THE FOLLOWING EVENTS  08/22/2013    Procedure: HIP INJECTION (PAIN);  Surgeon: Julián Suarez MD;  Location: [facility] PAIN MANAGEMENT    PATIENT WITHOUGH PREOPERATIVE ORDER FOR IV ANTIBIOTIC SURGICAL SITE INFECTION PROPHYLAXIS.  08/22/2013    Procedure: HIP INJECTION (PAIN);  Surgeon: Julián Suarez MD;  Location: [facility] PAIN MANAGEMENT    TONSILLECTOMY      TOTAL HIP REPLACEMENT       Family History   Problem Relation Age of Onset    Cancer Father         prostate and throat    Heart Disorder Mother     Heart Attack Mother     Other (Parkinson's Dz) Mother 57      reports that she has never smoked. She has never been exposed to tobacco smoke. She has never used smokeless tobacco. She reports current alcohol use of about 5.0 standard drinks of alcohol per week. She reports that she does  not use drugs.    ALLERGIES:     Allergies   Allergen Reactions    Adhesive Tape OTHER (SEE COMMENTS)     Welts    Sulfa Antibiotics UNKNOWN     \"Did not feel good.\" Daughters do not believe patient is allergic. Bactrim taken many times according to med history.       MEDICATIONS:       Current Facility-Administered Medications:     ALPRAZolam (Xanax) tab 0.25 mg, 0.25 mg, Oral, Nightly PRN    sodium chloride 0.9% infusion, , Intravenous, Continuous    heparin (Porcine) 5000 UNIT/ML injection 5,000 Units, 5,000 Units, Subcutaneous, Q8H SHABANA    acetaminophen (Tylenol Extra Strength) tab 500 mg, 500 mg, Oral, Q4H PRN    polyethylene glycol (PEG 3350) (Miralax) 17 g oral packet 17 g, 17 g, Oral, Daily PRN    sennosides (Senokot) tab 17.2 mg, 17.2 mg, Oral, Nightly PRN    bisacodyl (Dulcolax) 10 MG rectal suppository 10 mg, 10 mg, Rectal, Daily PRN    ondansetron (Zofran) 4 MG/2ML injection 4 mg, 4 mg, Intravenous, Q6H PRN    metoclopramide (Reglan) 5 mg/mL injection 5 mg, 5 mg, Intravenous, Q8H PRN    influenza vaccine high dose quad (Fluzone QIV HD) 0.7 mL IM injection (ages >/= 65 years) 0.7 mL, 0.7 mL, Intramuscular, Prior to discharge    calcium carbonate (Tums) chewable tab 1,000 mg, 1,000 mg, Oral, Q6H PRN  No current outpatient medications on file.         PHYSICAL EXAM:     Vital Signs: /60 (BP Location: Right arm)   Pulse 73   Temp 98.1 °F (36.7 °C) (Oral)   Resp 18   Wt 135 lb (61.2 kg)   SpO2 95%   BMI 25.51 kg/m²   Temp (24hrs), Av.7 °F (36.5 °C), Min:97.1 °F (36.2 °C), Max:98.2 °F (36.8 °C)       Intake/Output Summary (Last 24 hours) at 2024 1555  Last data filed at 2024 0826  Gross per 24 hour   Intake 240 ml   Output 750 ml   Net -510 ml     Wt Readings from Last 3 Encounters:   24 135 lb (61.2 kg)   24 134 lb 14.7 oz (61.2 kg)   22 138 lb 9.6 oz (62.9 kg)       General: pleasant, well appearing  Skin: no visible rashes  HEENT: NCAT  Cardiac: Regular rate and  rhythm, no murmur/gallop or rub  Lungs: CTAB, no wheeze, no rale, no rhonchi  Abdomen: Soft, NTND  Extremities: warm, well perfused, no leg edema  Neurologic/Psych: mentating well, no asterixis    LABORATORY DATA:       Lab Results   Component Value Date     (H) 01/28/2024    BUN 51 (H) 01/28/2024    BUNCREA 25.4 (H) 06/06/2021    CREATSERUM 1.73 (H) 01/28/2024    ANIONGAP 5 01/28/2024    GFRNAA 86 07/28/2022    GFRAA 99 07/28/2022    CA 8.6 01/28/2024    OSMOCALC 308 (H) 01/28/2024    ALKPHO 83 01/27/2024    AST 19 01/27/2024    ALT  01/27/2024      Comment:      Due to  backorder we are temporarily unable to offer hospital-based ALT testing at [facility].   If urgently needed, please order ALT test code [id number].   The new order will need a new venipuncture and will be sent to [facility] for testing.   The expected turnaround time will be within 24 hours.     BILT 0.4 01/27/2024    TP 6.6 01/27/2024    ALB 3.5 01/27/2024    GLOBULIN 3.1 01/27/2024    AGRATIO 2.4 05/31/2016     01/28/2024    K 3.9 01/28/2024     01/28/2024    CO2 26.0 01/28/2024     Lab Results   Component Value Date    WBC 6.4 01/28/2024    RBC 3.14 (L) 01/28/2024    HGB 10.5 (L) 01/28/2024    HCT 32.2 (L) 01/28/2024    .0 01/28/2024    .5 (H) 01/28/2024    MCH 33.4 01/28/2024    MCHC 32.6 01/28/2024    RDW 11.9 01/28/2024    NEPRELIM 3.77 01/28/2024    NEPERCENT 59.0 01/28/2024    LYPERCENT 25.4 01/28/2024    MOPERCENT 11.4 01/28/2024    EOPERCENT 3.1 01/28/2024    BAPERCENT 0.8 01/28/2024    NE 3.77 01/28/2024    LYMABS 1.62 01/28/2024    MOABSO 0.73 01/28/2024    EOABSO 0.20 01/28/2024    BAABSO 0.05 01/28/2024     Lab Results   Component Value Date    CREUR 130.00 01/27/2024     Lab Results   Component Value Date    COLORUR Yellow 01/27/2024    CLARITY Turbid (A) 01/27/2024    SPECGRAVITY 1.016 01/27/2024    GLUUR Normal 01/27/2024    BILUR Negative 01/27/2024    KETUR Negative 01/27/2024     BLOODURINE 1+ (A) 01/27/2024    PHURINE 5.0 01/27/2024    PROUR 30 (A) 01/27/2024    UROBILINOGEN Normal 01/27/2024    NITRITE Negative 01/27/2024    LEUUR 500 (A) 01/27/2024    WBCUR >50 (A) 01/27/2024    RBCUR 6-10 (A) 01/27/2024    EPIUR Few (A) 01/27/2024    BACUR Rare (A) 01/27/2024    HYLUR Present (A) 01/27/2024         IMAGING:     Reviewed.      ASSESSMENT/PLAN:   82 yo F with recent admission for PPM placement,, diastolic CHF, HTN, dementia and chronic indwelling zhao presented with zhao not draining x 1 day associated with SINAN with creatinine up to 2.86 mg/dl from baseline of 0.8-0.9 mg/dl.    SINAN: likely in the setting of obstruction from zhao not draining  -- trial off IV fluids; hold spironolactone and lasix until creatinine returns closer to baseline  -- avoid NSAIDs, IV contrast  -- I/Os    dCHF:  -- euvolemic on exam; hold further IV fluids    HTN:  -- BP is acceptable off meds    D/w patient's daughter at bedside.  Will follow.      Thank you for allowing me to participate in the care of your patient. Please do not hesitate to contact me with concerns or questions.    Gisele More MD  [facility]  [address] [place] [id number]    1/28/2024  3:55 PM      Electronically signed by Gisele More MD on 1/28/2024  3:59 PM              Discharge Summary - D/C Summary      Discharge Summary signed by Kvng Plummer DO at 1/29/2024  4:17 PM  Version 1 of 1    Author: Kvng Plummer DO Service: Hospitalist Author Type: Physician    Filed: 1/29/2024  4:17 PM Date of Service: 1/29/2024  4:05 PM Status: Signed    : Kvng Plummer DO (Physician)       [facility] Discharge Summary    Patient ID  Evelyn Reeves  [id number]  83 year old  2/25/1940    Admit date: 1/27/2024    Discharge date: 01/29/24    Attending: Kvng Plummer DO     Primary Care Physician: Brendan Love MD      Reason for admission:sinan    Discharge condition:  stable    Disposition: home    Important follow up:  -PCP within 7 d  -specialists:      -labs:    -radiology:      Additional patient instructions    Reduce lasix to 20 mg daily.   Continue rest of home medications  Repeat BMP (to check kidney function) later this week     Discharge med list     Medication List        ASK your doctor about these medications      acetaminophen 325 MG Tabs  Commonly known as: Tylenol     ALPRAZolam 0.25 MG Tabs  Commonly known as: Xanax     calcium carbonate 500 MG Chew  Commonly known as: Tums     furosemide 40 MG Tabs  Commonly known as: Lasix     lisinopril 40 MG Tabs  Commonly known as: Prinivil; Zestril     methenamine 1 g Tabs  Commonly known as: Hiprex     Milk of Magnesia 400 MG/5ML Susp  Generic drug: magnesium hydroxide     senna-docusate 8.6-50 MG Tabs  Commonly known as: Senokot-S     spironolactone 25 MG Tabs  Commonly known as: Aldactone              Discharge Diagnoses:    LEONARD  Chronic Indwelling Cooper Catheter  Abnormal UA  Anxiety  HTN     Consults:  IP CONSULT TO NEPHROLOGY  IP CONSULT TO NEPHROLOGY    Radiology:  US KIDNEY/BLADDER (CPT=76770)    Result Date: 1/28/2024  PROCEDURE:  US KIDNEY/BLADDER (CPT=76770)  COMPARISON:  [facility], US ABD COMPL W-O DOPPLER, 4/20/2005, 9:17 AM.  INDICATIONS:  Decreased urine output/oliguria.  TECHNIQUE:  Transabdominal gray scale ultrasound imaging of the bilateral kidneys and bladder was performed.  Routine technique was utilized.   PATIENT STATED HISTORY: (As transcribed by Technologist)  Patient states she had pacemaker surgery 01/26/2024. Patient has a Cooper and she states she had little urine output. Acute renal failure and chronic kidney disease.    FINDINGS:   RIGHT KIDNEY MEASUREMENTS:  10.6 x 5.8 x 3.7 cm ECHOGENICITY:  Normal. HYDRONEPHROSIS:  None. CYSTS/STONES/MASSES:  Simple cyst along the midpole of the right kidney measuring 1.2 cm.  This is decreased in size from prior imaging, previously measuring 2.1 cm.  LEFT  KIDNEY MEASUREMENTS:  10.4 x 5.2 x 4.5 cm ECHOGENICITY:  Normal. HYDRONEPHROSIS:  None. CYSTS/STONES/MASSES:  None.  BLADDER:  The bladder is decompressed with the presence of a Cooper catheter.            CONCLUSION:  No acute process or significant disease noted.  Right midpole renal cyst is decreased in size from prior imaging.   LOCATION:  [facility]     Dictated by (CST): Nic CornelioDO seng on 1/28/2024 at 9:04 AM     Finalized by (CST): NicGabriele conti DO on 1/28/2024 at 9:07 AM       XR CHEST PA + LAT CHEST (CPT=71046)    Result Date: 1/26/2024  PROCEDURE:  XR CHEST PA + LAT CHEST (CPT=71046)  INDICATIONS:  PM implant  COMPARISON:  [facility] , XR, XR CHEST AP PORTABLE  (CPT=71045), 1/25/2024, 6:50 PM.  [facility] , XR, XR CHEST AP PORTABLE  (CPT=71045), 1/23/2024, 3:53 PM.  TECHNIQUE:  PA and lateral chest radiographs were obtained.  PATIENT STATED HISTORY: (As transcribed by Technologist)  Pacemaker implant.    FINDINGS:  Stable left-sided pacemaker.  Hyperexpansion of the lungs.  No pleural effusions.  No pneumothorax.  Stable cardiac size.  Osteoarthritic changes within the shoulders.  Atheromatous calcifications of the aorta.            CONCLUSION:  1. Hyperexpansion of the lungs.  2. Stable left-sided pacemaker placement.    LOCATION:  [facility]   Dictated by ([facility]): Christine Lucas MD on 1/26/2024 at 10:01 AM     Finalized by (CST): Christine Lucas MD on 1/26/2024 at 10:04 AM       XR CHEST AP PORTABLE  (CPT=71045)    Result Date: 1/25/2024  PROCEDURE:  XR CHEST AP PORTABLE  (CPT=71045)  TECHNIQUE:  AP chest radiograph was obtained.  COMPARISON:  [facility] , XR, XR CHEST AP PORTABLE  (CPT=71045), 1/23/2024, 3:53 PM.  INDICATIONS:  PM implant  PATIENT STATED HISTORY: (As transcribed by Technologist)  Patient offered no additional history at this time.     FINDINGS:  Left chest pacemaker noted.  Cardiac silhouette is mildly enlarged.  Minimal bibasilar atelectasis and/or scarring.  Lungs are hyperexpanded.  No significant  pleural fluid or pneumothorax.            CONCLUSION:  Minimal bibasilar atelectasis/scarring.  No measurable pneumothorax.   LOCATION:  [facility]      Dictated by (CST): Stromberg, LeRoy, MD on 2024 at 7:23 PM     Finalized by (CST): Stromberg, LeRoy, MD on 2024 at 7:25 PM       CATH EP    Result Date: 2024  This exam has been completed. Please refer to Notes for the results to this procedure.    CARD ECHO 2D DOPPLER (CPT=93306)    Result Date: 2024  Transthoracic Echocardiogram Name:Evelyn Reeves Date: 2024 :  1940 Ht:  (61in)  BP: 119 / 50 MRN:  [id number]     Age:  83years    Wt:  (147lb) HR: 36bpm Loc:  [facility]       Gndr: F          BSA: 1.66m^2 Sonographer: Yamilka CASH AE, PE Ordering:    Juan Luis Gonzalez Consulting:  Kvng Plummer ---------------------------------------------------------------------------- History/Indications:  Bradycardia, Chronic chf. Second degree atrioventricular block. ---------------------------------------------------------------------------- Procedure information:  A transthoracic complete 2D study was performed. Additional evaluation included M-mode, complete spectral Doppler, and color Doppler.  Patient status:  Inpatient.  Location:  Bedside.    This was a routine study. Transthoracic echocardiography for ventricular function evaluation. Image quality was adequate. ---------------------------------------------------------------------------- Conclusions: 1. Left ventricle: The cavity size was normal. Wall thickness was normal.    Systolic function was normal. The estimated ejection fraction was 60-65%.    Unable to assess LV diastolic function. 2. Left atrium: The atrium was mildly dilated. 3. Mitral valve: There was mild regurgitation. 4. Pulmonary arteries: Systolic pressure was at the upper limits of normal,    estimated to be 31mm Hg. Estimated pulmonary artery diastolic pressure    was 8mm Hg. Impressions:  No previous study was available for  comparison. * ---------------------------------------------------------------------------- * Findings: Left ventricle:  The cavity size was normal. Wall thickness was normal. Systolic function was normal. The estimated ejection fraction was 60-65%. Unable to assess LV diastolic function. Left atrium:  The atrium was mildly dilated. Right ventricle:  The cavity size was normal. Systolic function was normal. Right atrium:  The atrium was normal in size. Mitral valve:  The valve was structurally normal. Leaflet separation was normal.  Doppler:  Transvalvular velocity was within the normal range. There was no evidence for stenosis. There was mild regurgitation. Aortic valve:   The valve was trileaflet. The leaflets were mildly calcified. Cusp separation was normal.  Doppler:  Transvalvular velocity was within the normal range. There was no evidence for stenosis. There was no significant regurgitation.    The mean systolic gradient was 8mm Hg. The peak systolic gradient was 15mm Hg. Tricuspid valve:  The valve is structurally normal. Leaflet separation was normal.  Doppler:  Transvalvular velocity was within the normal range. There was no evidence for stenosis. There was mild regurgitation. Pulmonic valve:   The valve is structurally normal. Cusp separation was normal.  Doppler:  Transvalvular velocity was within the normal range. There was no evidence for stenosis. There was trivial regurgitation. Pericardium:   There was no pericardial effusion. Aorta: Aortic root: The aortic root was normal-sized. Ascending aorta: The ascending aorta was normal. Pulmonary arteries: The main pulmonary artery was normal-sized. Systolic pressure was at the upper limits of normal, estimated to be 31mm Hg. Estimated pulmonary artery diastolic pressure was 8mm Hg. Systemic veins: Inferior vena cava: The IVC was normally collapsible and normal-sized. ---------------------------------------------------------------------------- Measurements   Left ventricle                    Value        Ref  IVS thickness, ED, PLAX           0.9   cm     0.6 -                                                 0.9  LV ID, ED, PLAX                   4.5   cm     3.8 -                                                 5.2  LV ID, ES, PLAX                   2.9   cm     2.2 -                                                 3.5  LV PW thickness, ED, PLAX     (H) 1.0   cm     0.6 -                                                 0.9  IVS/LV PW ratio, ED, PLAX         0.97         --------  LV PW/LV ID ratio, ED, PLAX       0.22         --------  LV ejection fraction              64    %      54 - 74  LVOT                              Value        Ref  LVOT peak velocity, S             1.05  m/sec  --------  LVOT VTI, S                       29.9  cm     --------  LVOT mean gradient, S             2     mm Hg  --------  Aortic valve                      Value        Ref  Aortic valve peak velocity, S     1.96  m/sec  --------  Aortic valve VTI, S               55.4  cm     --------  Aortic mean gradient, S           8     mm Hg  --------  Aortic peak gradient, S           15    mm Hg  --------  Velocity ratio, peak, LVOT/AV     0.54         --------  Aortic root                       Value        Ref  Aortic root ID, ED, MM            3.2   cm     --------  Ascending aorta                   Value        Ref  Ascending aorta ID, A-P, ED       3.1   cm     1.9 -                                                 3.5  Left atrium                       Value        Ref  LA volume, ES, 1-p A4C        (H) 69    ml     22 - 52  LA ID, A-P, ES, MM            (H) 4.6   cm     2.7 -                                                 3.8  LA ID/bsa, A-P, ES, MM        (H) 2.8   cm/m^2 1.5 -                                                 2.3  LA/aortic root ratio, MM          1.44         --------  Pulmonary artery                  Value        Ref  PA pressure, S, DP                31    mm Hg   --------  PA pressure, ED, DP               8     mm Hg  --------  Tricuspid valve                   Value        Ref  Tricuspid regurg peak             2.66  m/sec  <=2.8  velocity  Tricuspid peak RV-RA gradient     28    mm Hg  --------  Systemic veins                    Value        Ref  Estimated CVP                     3     mm Hg  --------  Right ventricle                   Value        Ref  RV pressure, S, DP                31    mm Hg  --------  Pulmonic valve                    Value        Ref  Pulmonic regurg velocity, ED      1.14  m/sec  --------  Pulmonic regurg gradient, ED      5     mm Hg  -------- Legend: (L)  and  (H)  gray values outside specified reference range. ---------------------------------------------------------------------------- Prepared and electronically signed by Leeroy Colindres MD 01/24/2024 15:44     XR CHEST AP PORTABLE  (CPT=71045)    Result Date: 1/23/2024  PROCEDURE:  XR CHEST AP PORTABLE  (CPT=71045)  TECHNIQUE:  AP chest radiograph was obtained.  COMPARISON:  08/10/2022  INDICATIONS:  2nd degree Heart Block-from [facility]  PATIENT STATED HISTORY: (As transcribed by Technologist)  Pt. with 2nd degree Heart Block-from [facility]                 CONCLUSION:   Stable cardiac and mediastinal contours.  Stable prominence of the pulmonary vasculature.  Mild reticular scarring atelectasis of the right lung base without pulmonary edema or acute airspace disease.  The pleural spaces are clear.   LOCATION:  [facility]      Dictated by (CST): Hugo Lopez MD on 1/23/2024 at 4:34 PM     Finalized by (CST): Hugo Lopez MD on 1/23/2024 at 4:35 PM         Operative reports:      Hospital course:    Evelyn Reeves Is a a 83 year old female who presents with LEONARD and indwelling catheter malfucntion     LEONARD  -- Cr 2.9 on admission, baseline normal   -- likely secondary to malfunctioning IFC however they may be a component of dehydration   -- Renal US unremarkable   -- Cr now  normalized with zhao exchange and ivf. Now off IVF and Cr remains stable. Her PO intake is good  Dw renal - ok to resume acei and home meds      Chronic Indwelling Zhao Catheter  Abnormal UA  -- UA not suggestive of UTI, no fevers, no leukocytosis, Cr improving with ivf  -- defer antibiotics at this time, CTM, Ucx with low cfu of psar.  She has improved wo abx, suspect asymptotic pyruia. Will hold off treatment at thsi time     Anxiety  -- resume home alprazolam prn     HTN  -- resume home meds        Day of discharge exam:  Vitals:    01/29/24 1200   BP: (!) 164/57   Pulse:    Resp: 18   Temp: 98.2 °F (36.8 °C)     Po intake is better  No nv  Feels well  Dw daughter    No acute distress, alert and oriented   Lungs clear  Heart regular  Abdomen benign    Total time coordinating care 32 min      Patient and/or family had opportunity to ask questions and expressed understanding and agreement with therapeutic plan as outlined         K[facility]ng Portillo Hospitalist  [phone]  Answering Service: [phone]      Electronically signed by Kvng Plummer DO on 1/29/2024  4:17 PM           Physical Therapy Notes (last 72 hours)  Notes from 1/26/2024  4:29 PM through 1/29/2024  4:29 PM   No notes of this type exist for this encounter.     Occupational Therapy Notes (last 72 hours)  Notes from 1/26/2024  4:29 PM through 1/29/2024  4:29 PM   No notes of this type exist for this encounter.     Video Swallow Study Notes    No notes of this type exist for this encounter.     SLP Notes    No notes of this type exist for this encounter.     Immunizations     Name Date      Ceftriaxone 250 Mg 08/13/15     INFLUENZA defer-01/29/24     Deferral: Patient Refused     TDAP 06/06/17       Multidisciplinary Problems     Active Goals        Problem: Patient/Family Goals    Goal Priority Disciplines Outcome Interventions   Patient/Family Long Term Goal     Interdisciplinary Progressing    Description: Patient's Long Term Goal:    Discharge with appropriate resources    Interventions:  - comply with POC  - See additional Care Plan goals for specific interventions   Patient/Family Short Term Goal     Interdisciplinary Progressing    Description: Patient's Short Term Goal:   1/28 noc: \"sleep tonight\"    Interventions:   - cluster care  -prn xanax  - See additional Care Plan goals for specific interventions

## (undated) NOTE — LETTER
April 23, 2019          [place] [id number]          Dear Colin Tesfaye:    Cardiac Calcium Score test:    Calcium score was 23 which places you in the low risk category for future cardiac events.       [employer]

## (undated) NOTE — LETTER
[address]  [id number]  Consent for Procedure/Sedation  Date:  1/24/24        Time: 1800    I hereby authorize Dr. Carlton, my physician and his/her assistants (if applicable), which may include medical students, residents, and/or fellows, to perform the following surgical operation/ procedure and administer such anesthesia as may be determined necessary by my physician: Insertion of Permanent Pacemaker on Evelyn A Facchinello  2.   I recognize that during the surgical operation/procedure, unforeseen conditions may necessitate additional or different procedures than those listed above.  I, therefore, further authorize and request that the above-named surgeon, assistants, or designees perform such procedures as are, in their judgment, necessary and desirable.    3.   My surgeon/physician has discussed prior to my surgery the potential benefits, risks and side effects of this procedure; the likelihood of achieving goals; and potential problems that might occur during recuperation.  They also discussed reasonable alternatives to the procedure, including risks, benefits, and side effects related to the alternatives and risks related to not receiving this procedure.  I have had all my questions answered and I acknowledge that no guarantee has been made as to the result that may be obtained.    4.   Should the need arise during my operation/procedure, which includes change of level of care prior to discharge, I also consent to the administration of blood and/or blood products.  Further, I understand that despite careful testing and screening of blood or blood products by collecting agencies, I may still be subject to ill effects as a result of receiving a blood transfusion and/or blood products.  The following are some, but not all, of the potential risks that can occur: fever and allergic reactions, hemolytic reactions, transmission of diseases such as Hepatitis, AIDS and  Cytomegalovirus (CMV) and fluid overload.  In the event that I wish to have an autologous transfusion of my own blood, or a directed donor transfusion, I will discuss this with my physician.   Check only if Refusing Blood or Blood Products  I understand refusal of blood or blood products as deemed necessary by my physician may have serious consequences to my condition to include possible death. I hereby assume responsibility for my refusal and release the hospital, its personnel, and my physicians from any responsibility for the consequences of my refusal.         o  Refuse         5.   I authorize the use of any specimen, organs, tissues, body parts or foreign objects that may be removed from my body during the operation/procedure for diagnosis, research or teaching purposes and their subsequent disposal by hospital authorities.  I also authorize the release of specimen test results and/or written reports to my treating physician on the hospital medical staff or other referring or consulting physicians involved in my care, at the discretion of the Pathologist or my treating physician.    6.   I consent to the photographing or videotaping of the operations or procedures to be performed, including appropriate portions of my body for medical, scientific, or educational purposes, provided my identity is not revealed by the pictures or by descriptive texts accompanying them.  If the procedure has been photographed/videotaped, the surgeon will obtain the original picture, image, videotape or CD.  The hospital will not be responsible for storage, release or maintenance of the picture, image, tape or CD.    7.   I consent to the presence of a  or observers in the operating room as deemed necessary by my physician or their designees.    8.   I recognize that in the event my procedure results in extended X-Ray/fluoroscopy time, I may develop a skin reaction.    9. If I have a Do Not Attempt Resuscitation  (DNAR) order in place, that status will be suspended while in the operating room, procedural suite, and during the recovery period unless otherwise explicitly stated by me (or a person authorized to consent on my behalf). The surgeon or my attending physician will determine when the applicable recovery period ends for purposes of reinstating the DNAR order.  10. Patients having a sterilization procedure: I understand that if the procedure is successful the results will be permanent and it will therefore be impossible for me to inseminate, conceive, or bear children.  I also understand that the procedure is intended to result in sterility, although the result has not been guaranteed.   11. I acknowledge that my physician has explained sedation/analgesia administration to me including the risk and benefits I consent to the administration of sedation/analgesia as may be necessary or desirable in the judgment of my physician.    I CERTIFY THAT I HAVE READ AND FULLY UNDERSTAND THE ABOVE CONSENT TO OPERATION and/or OTHER PROCEDURE.        ____________________________________       _________________________________      ______________________________  Signature of Patient         Signature of Responsible Person        Printed Name of Responsible Person        ____________________________________      _________________________________      ______________________________       Signature of Witness          Relationship to Patient                       Date                                       Time  Patient Name: Evelyn STERLING Leadnro     : 1940                 Printed: 2024      Medical Record #: [id number]                      Page 1 of

## (undated) NOTE — LETTER
[address]  [address]  Consent for Procedure/Sedation  Date: 1/25/2024         Time: 1322    I hereby authorize Dr. Carlton, my physician and his/her assistants (if applicable), which may include medical students, residents, and/or fellows, to perform the following surgical operation/ procedure and administer such anesthesia as may be determined necessary by my physician: Insertion of Permanent Pacemaker on Evelyn A Facchinello  2.   I recognize that during the surgical operation/procedure, unforeseen conditions may necessitate additional or different procedures than those listed above.  I, therefore, further authorize and request that the above-named surgeon, assistants, or designees perform such procedures as are, in their judgment, necessary and desirable.    3.   My surgeon/physician has discussed prior to my surgery the potential benefits, risks and side effects of this procedure; the likelihood of achieving goals; and potential problems that might occur during recuperation.  They also discussed reasonable alternatives to the procedure, including risks, benefits, and side effects related to the alternatives and risks related to not receiving this procedure.  I have had all my questions answered and I acknowledge that no guarantee has been made as to the result that may be obtained.    4.   Should the need arise during my operation/procedure, which includes change of level of care prior to discharge, I also consent to the administration of blood and/or blood products.  Further, I understand that despite careful testing and screening of blood or blood products by collecting agencies, I may still be subject to ill effects as a result of receiving a blood transfusion and/or blood products.  The following are some, but not all, of the potential risks that can occur: fever and allergic reactions, hemolytic reactions, transmission of diseases such as Hepatitis, AIDS and  Cytomegalovirus (CMV) and fluid overload.  In the event that I wish to have an autologous transfusion of my own blood, or a directed donor transfusion, I will discuss this with my physician.   Check only if Refusing Blood or Blood Products  I understand refusal of blood or blood products as deemed necessary by my physician may have serious consequences to my condition to include possible death. I hereby assume responsibility for my refusal and release the hospital, its personnel, and my physicians from any responsibility for the consequences of my refusal.         o  Refuse         5.   I authorize the use of any specimen, organs, tissues, body parts or foreign objects that may be removed from my body during the operation/procedure for diagnosis, research or teaching purposes and their subsequent disposal by hospital authorities.  I also authorize the release of specimen test results and/or written reports to my treating physician on the hospital medical staff or other referring or consulting physicians involved in my care, at the discretion of the Pathologist or my treating physician.    6.   I consent to the photographing or videotaping of the operations or procedures to be performed, including appropriate portions of my body for medical, scientific, or educational purposes, provided my identity is not revealed by the pictures or by descriptive texts accompanying them.  If the procedure has been photographed/videotaped, the surgeon will obtain the original picture, image, videotape or CD.  The hospital will not be responsible for storage, release or maintenance of the picture, image, tape or CD.    7.   I consent to the presence of a  or observers in the operating room as deemed necessary by my physician or their designees.    8.   I recognize that in the event my procedure results in extended X-Ray/fluoroscopy time, I may develop a skin reaction.    9. If I have a Do Not Attempt Resuscitation  (DNAR) order in place, that status will be suspended while in the operating room, procedural suite, and during the recovery period unless otherwise explicitly stated by me (or a person authorized to consent on my behalf). The surgeon or my attending physician will determine when the applicable recovery period ends for purposes of reinstating the DNAR order.  10. Patients having a sterilization procedure: I understand that if the procedure is successful the results will be permanent and it will therefore be impossible for me to inseminate, conceive, or bear children.  I also understand that the procedure is intended to result in sterility, although the result has not been guaranteed.   11. I acknowledge that my physician has explained sedation/analgesia administration to me including the risk and benefits I consent to the administration of sedation/analgesia as may be necessary or desirable in the judgment of my physician.    I CERTIFY THAT I HAVE READ AND FULLY UNDERSTAND THE ABOVE CONSENT TO OPERATION and/or OTHER PROCEDURE.        ____________________________________       _________________________________      ______________________________  Signature of Patient         Signature of Responsible Person        Printed Name of Responsible Person        ____________________________________      _________________________________      ______________________________       Signature of Witness          Relationship to Patient                       Date                                       Time  Patient Name: Evelyn STERLING Leandro     : 1940                 Printed: 2024      Medical Record #: [id number]                      Page 1 of

## (undated) NOTE — IP AVS SNAPSHOT
[address]            (For Outpatient Use Only) Initial Admit Date: 8/10/2022   Inpt/Obs Admit Date: Inpt: N/A / Obs: 08/10/22   Discharge Date:    Hospital Acct:  [de-identified]   MRN: [de-identified]   CSN: [id number]   CEID: [id number]        ENCOUNTER  Patient Class: Observation Admitting Provider: Sina Kehr, MD Unit: [address] [place] [address] 3SW-A   Hospital Service: Medical Attending Provider: Archie Romero MD   Bed: 375-A   Visit Type:   Referring Physician: No ref. provider found Billing Flag:    Admit Diagnosis: Urinary retention [R33.9]      PATIENT  Legal Name:   Dee Walker   Legal Sex: Female  Gender ID:              [address] [place] [address],[address] Name:    PCP:  Agata Whitman MD Home: [phone]   Address:  [address] [place] [address] : 1940 (82 yrs) Mobile: No mobile phone on file. City/State/Zip: [address] [place] [address] Marital:  Language: [address] [place] [address] [id number] Church: [facility]     Race: White Ethnicity: Non  Or  O*   EMERGENCY CONTACT   Name Relationship Legal Guardian? Home Phone Work Phone Mobile Phone   1. Earlene Ruiz  2.  Lima Martinez Daughter  Daughter    [phone]     GUARANTOR  Guarantor: Greta STERLING : 1940 Home Phone: [phone]   Address: [address] [place] [address]  Sex: Female Work Phone: [phone]   City/State/Zip: [address] [place] [address]   Rel. to Patient: Self Guarantor ID: [id number]   GUARANTOR EMPLOYER   Employer:  Status: RETIRED     COVERAGE  PRIMARY INSURANCE   Payor: MEDICARE Plan: MEDICARE PART A&B   Group Number:  Insurance Type: INDEMNITY   Subscriber Name: Mable Larios : 1940   Subscriber ID: [id number] Pt Rel to Subscriber: Self   SECONDARY INSURANCE   Payor: [address] [place] [address] Drive: [address] [place] [address]   Group Number: [id number] Insurance Type: [id number] Name: Mable Larios : 1940   Subscriber ID: [id number] Pt Rel to Subscriber: SELF   TERTIARY INSURANCE   Payor:  Plan:    Group Number:  Insurance Type:    Subscriber Name:  Subscriber :    Subscriber ID:  Pt Rel to Subscriber:    Hospital Account Financial Class: Medicare    2022